# Patient Record
Sex: MALE | Race: BLACK OR AFRICAN AMERICAN | NOT HISPANIC OR LATINO | Employment: FULL TIME | URBAN - METROPOLITAN AREA
[De-identification: names, ages, dates, MRNs, and addresses within clinical notes are randomized per-mention and may not be internally consistent; named-entity substitution may affect disease eponyms.]

---

## 2021-09-21 ENCOUNTER — TELEPHONE (OUTPATIENT)
Dept: SPORTS MEDICINE | Facility: CLINIC | Age: 41
End: 2021-09-21

## 2021-09-23 ENCOUNTER — TELEPHONE (OUTPATIENT)
Dept: SPORTS MEDICINE | Facility: CLINIC | Age: 41
End: 2021-09-23

## 2021-10-11 ENCOUNTER — OFFICE VISIT (OUTPATIENT)
Dept: SPORTS MEDICINE | Facility: CLINIC | Age: 41
End: 2021-10-11
Payer: MEDICAID

## 2021-10-11 ENCOUNTER — HOSPITAL ENCOUNTER (OUTPATIENT)
Dept: RADIOLOGY | Facility: HOSPITAL | Age: 41
Discharge: HOME | End: 2021-10-11
Attending: ORTHOPAEDIC SURGERY
Payer: MEDICAID

## 2021-10-11 VITALS
TEMPERATURE: 98 F | HEART RATE: 73 BPM | SYSTOLIC BLOOD PRESSURE: 134 MMHG | BODY MASS INDEX: 32.21 KG/M2 | HEIGHT: 74 IN | DIASTOLIC BLOOD PRESSURE: 81 MMHG | WEIGHT: 251 LBS

## 2021-10-11 DIAGNOSIS — M17.31 POST-TRAUMATIC OSTEOARTHRITIS OF RIGHT KNEE: ICD-10-CM

## 2021-10-11 DIAGNOSIS — Z98.890 S/P ACL RECONSTRUCTION: ICD-10-CM

## 2021-10-11 DIAGNOSIS — M25.561 RIGHT KNEE PAIN, UNSPECIFIED CHRONICITY: ICD-10-CM

## 2021-10-11 DIAGNOSIS — M25.561 RIGHT KNEE PAIN, UNSPECIFIED CHRONICITY: Primary | ICD-10-CM

## 2021-10-11 PROCEDURE — 99214 PR OFFICE/OUTPT VISIT, EST, LEVL IV, 30-39 MIN: ICD-10-PCS | Mod: S$PBB,25,, | Performed by: ORTHOPAEDIC SURGERY

## 2021-10-11 PROCEDURE — 99214 OFFICE O/P EST MOD 30 MIN: CPT | Mod: S$PBB,25,, | Performed by: ORTHOPAEDIC SURGERY

## 2021-10-11 PROCEDURE — 73564 X-RAY EXAM KNEE 4 OR MORE: CPT | Mod: 26,50,, | Performed by: RADIOLOGY

## 2021-10-11 PROCEDURE — 20611 DRAIN/INJ JOINT/BURSA W/US: CPT | Mod: PBBFAC | Performed by: ORTHOPAEDIC SURGERY

## 2021-10-11 PROCEDURE — 99214 OFFICE O/P EST MOD 30 MIN: CPT | Mod: PBBFAC | Performed by: ORTHOPAEDIC SURGERY

## 2021-10-11 PROCEDURE — 97110 PR THERAPEUTIC EXERCISES: ICD-10-PCS | Mod: GP,,, | Performed by: ORTHOPAEDIC SURGERY

## 2021-10-11 PROCEDURE — 97110 THERAPEUTIC EXERCISES: CPT | Mod: GP,,, | Performed by: ORTHOPAEDIC SURGERY

## 2021-10-11 PROCEDURE — 99999 PR PBB SHADOW E&M-EST. PATIENT-LVL IV: CPT | Mod: PBBFAC,,, | Performed by: ORTHOPAEDIC SURGERY

## 2021-10-11 PROCEDURE — 73564 XR KNEE ORTHO BILAT WITH FLEXION: ICD-10-PCS | Mod: 26,50,, | Performed by: RADIOLOGY

## 2021-10-11 PROCEDURE — 99999 PR PBB SHADOW E&M-EST. PATIENT-LVL IV: ICD-10-PCS | Mod: PBBFAC,,, | Performed by: ORTHOPAEDIC SURGERY

## 2021-10-11 PROCEDURE — 20611 LARGE JOINT ASPIRATION/INJECTION: R KNEE: ICD-10-PCS | Mod: S$PBB,RT,, | Performed by: ORTHOPAEDIC SURGERY

## 2021-10-11 PROCEDURE — 73564 X-RAY EXAM KNEE 4 OR MORE: CPT | Mod: TC,50

## 2021-10-11 RX ORDER — MELOXICAM 15 MG/1
15 TABLET ORAL DAILY
Qty: 30 TABLET | Refills: 6 | Status: SHIPPED | OUTPATIENT
Start: 2021-10-11 | End: 2021-11-10

## 2021-10-11 RX ORDER — CELECOXIB 200 MG/1
200 CAPSULE ORAL 2 TIMES DAILY
Qty: 90 CAPSULE | Refills: 6 | Status: CANCELLED | OUTPATIENT
Start: 2021-10-11

## 2021-10-11 RX ADMIN — TRIAMCINOLONE ACETONIDE 80 MG: 40 INJECTION, SUSPENSION INTRA-ARTICULAR; INTRAMUSCULAR at 09:10

## 2021-10-14 RX ORDER — TRIAMCINOLONE ACETONIDE 40 MG/ML
80 INJECTION, SUSPENSION INTRA-ARTICULAR; INTRAMUSCULAR
Status: DISCONTINUED | OUTPATIENT
Start: 2021-10-11 | End: 2021-10-14 | Stop reason: HOSPADM

## 2021-10-20 ENCOUNTER — CLINICAL SUPPORT (OUTPATIENT)
Dept: REHABILITATION | Facility: HOSPITAL | Age: 41
End: 2021-10-20
Attending: ORTHOPAEDIC SURGERY
Payer: MEDICAID

## 2021-10-20 DIAGNOSIS — R26.2 DIFFICULTY WALKING: ICD-10-CM

## 2021-10-20 DIAGNOSIS — M25.561 CHRONIC PAIN OF RIGHT KNEE: Primary | ICD-10-CM

## 2021-10-20 DIAGNOSIS — Z98.890 S/P ACL RECONSTRUCTION: ICD-10-CM

## 2021-10-20 DIAGNOSIS — G89.29 CHRONIC PAIN OF RIGHT KNEE: Primary | ICD-10-CM

## 2021-10-20 DIAGNOSIS — M25.661 KNEE STIFFNESS, RIGHT: ICD-10-CM

## 2021-10-20 PROCEDURE — 97110 THERAPEUTIC EXERCISES: CPT | Performed by: PHYSICAL THERAPIST

## 2021-10-20 PROCEDURE — 97161 PT EVAL LOW COMPLEX 20 MIN: CPT | Performed by: PHYSICAL THERAPIST

## 2021-11-22 ENCOUNTER — CLINICAL SUPPORT (OUTPATIENT)
Dept: REHABILITATION | Facility: HOSPITAL | Age: 41
End: 2021-11-22
Payer: MEDICAID

## 2021-11-22 DIAGNOSIS — M25.661 KNEE STIFFNESS, RIGHT: ICD-10-CM

## 2021-11-22 DIAGNOSIS — R26.2 DIFFICULTY WALKING: ICD-10-CM

## 2021-11-22 PROCEDURE — 97110 THERAPEUTIC EXERCISES: CPT

## 2021-12-06 ENCOUNTER — PATIENT MESSAGE (OUTPATIENT)
Dept: SPORTS MEDICINE | Facility: CLINIC | Age: 41
End: 2021-12-06
Payer: MEDICAID

## 2021-12-29 ENCOUNTER — PATIENT MESSAGE (OUTPATIENT)
Dept: SPORTS MEDICINE | Facility: CLINIC | Age: 41
End: 2021-12-29
Payer: MEDICAID

## 2022-01-31 ENCOUNTER — OFFICE VISIT (OUTPATIENT)
Dept: SPORTS MEDICINE | Facility: CLINIC | Age: 42
End: 2022-01-31
Payer: MEDICAID

## 2022-01-31 VITALS
HEART RATE: 61 BPM | HEIGHT: 74 IN | BODY MASS INDEX: 31.44 KG/M2 | SYSTOLIC BLOOD PRESSURE: 129 MMHG | WEIGHT: 245 LBS | DIASTOLIC BLOOD PRESSURE: 84 MMHG

## 2022-01-31 DIAGNOSIS — M25.461 EFFUSION OF RIGHT KNEE: ICD-10-CM

## 2022-01-31 DIAGNOSIS — M12.561 TRAUMATIC ARTHRITIS OF KNEE, RIGHT: Primary | ICD-10-CM

## 2022-01-31 PROCEDURE — 99214 PR OFFICE/OUTPT VISIT, EST, LEVL IV, 30-39 MIN: ICD-10-PCS | Mod: S$PBB,,, | Performed by: PHYSICIAN ASSISTANT

## 2022-01-31 PROCEDURE — 3079F DIAST BP 80-89 MM HG: CPT | Mod: CPTII,,, | Performed by: PHYSICIAN ASSISTANT

## 2022-01-31 PROCEDURE — 1159F PR MEDICATION LIST DOCUMENTED IN MEDICAL RECORD: ICD-10-PCS | Mod: CPTII,,, | Performed by: PHYSICIAN ASSISTANT

## 2022-01-31 PROCEDURE — 3079F PR MOST RECENT DIASTOLIC BLOOD PRESSURE 80-89 MM HG: ICD-10-PCS | Mod: CPTII,,, | Performed by: PHYSICIAN ASSISTANT

## 2022-01-31 PROCEDURE — 3074F SYST BP LT 130 MM HG: CPT | Mod: CPTII,,, | Performed by: PHYSICIAN ASSISTANT

## 2022-01-31 PROCEDURE — 3008F PR BODY MASS INDEX (BMI) DOCUMENTED: ICD-10-PCS | Mod: CPTII,,, | Performed by: PHYSICIAN ASSISTANT

## 2022-01-31 PROCEDURE — 1160F PR REVIEW ALL MEDS BY PRESCRIBER/CLIN PHARMACIST DOCUMENTED: ICD-10-PCS | Mod: CPTII,,, | Performed by: PHYSICIAN ASSISTANT

## 2022-01-31 PROCEDURE — 99214 OFFICE O/P EST MOD 30 MIN: CPT | Mod: S$PBB,,, | Performed by: PHYSICIAN ASSISTANT

## 2022-01-31 PROCEDURE — 3074F PR MOST RECENT SYSTOLIC BLOOD PRESSURE < 130 MM HG: ICD-10-PCS | Mod: CPTII,,, | Performed by: PHYSICIAN ASSISTANT

## 2022-01-31 PROCEDURE — 99999 PR PBB SHADOW E&M-EST. PATIENT-LVL III: ICD-10-PCS | Mod: PBBFAC,,, | Performed by: PHYSICIAN ASSISTANT

## 2022-01-31 PROCEDURE — 1160F RVW MEDS BY RX/DR IN RCRD: CPT | Mod: CPTII,,, | Performed by: PHYSICIAN ASSISTANT

## 2022-01-31 PROCEDURE — 1159F MED LIST DOCD IN RCRD: CPT | Mod: CPTII,,, | Performed by: PHYSICIAN ASSISTANT

## 2022-01-31 PROCEDURE — 99999 PR PBB SHADOW E&M-EST. PATIENT-LVL III: CPT | Mod: PBBFAC,,, | Performed by: PHYSICIAN ASSISTANT

## 2022-01-31 PROCEDURE — 99213 OFFICE O/P EST LOW 20 MIN: CPT | Mod: PBBFAC | Performed by: PHYSICIAN ASSISTANT

## 2022-01-31 PROCEDURE — 3008F BODY MASS INDEX DOCD: CPT | Mod: CPTII,,, | Performed by: PHYSICIAN ASSISTANT

## 2022-01-31 NOTE — PROGRESS NOTES
Subjective:          Chief Complaint: Pau Brito is a 41 y.o. male who had concerns including Pain of the Right Knee.       Pau Brito is a 41 y.o. male  here for evaluation of his right knee. The pain started 10 years ago after pivot/twist mechanism of injury and is becoming progressively worse. He notes he had an ACL tear and surgery 10+ years ago and continues to have pain and disability. Pain is located over (points to) medial, lateral and anterior.He reports NSAIDS including ibuprofen with mild improvement previous treatments. Pain is affecting ADLs and limiting desired level of activity.   No relief with Mobic. Pain is 0/10 at rest and 10/10 at its worst. He attended formal PT twice and would like a new referral.     He received a CSI injection into his right knee on 10/11/21 with Dr. Duffy and states that pain improved by 50% for 1 month before arthritic pain gradually returned. He has been using his visco skin compression sleeve during physical activity that includes shooting basketball a few times a week. He has noticed recent right knee swelling but denies weakness or numbness.     Mechanical symptoms:locking and swelling  Subjective instability: (--)   Worse with activity and walking  Better with OTC NSAIDs  Nocturnal symptoms: (+)        Pain  Associated symptoms include joint swelling. Pertinent negatives include no abdominal pain, chest pain, chills, congestion, coughing, fever, nausea, numbness, rash, sore throat or vomiting.       Review of Systems   Constitutional: Negative for chills and fever.   HENT: Negative for congestion and sore throat.    Eyes: Negative for discharge and double vision.   Cardiovascular: Negative for chest pain, palpitations and syncope.   Respiratory: Negative for cough and shortness of breath.    Endocrine: Negative for cold intolerance and heat intolerance.   Skin: Negative for dry skin and rash.   Musculoskeletal: Positive for joint pain, joint swelling and  stiffness.   Gastrointestinal: Negative for abdominal pain, nausea and vomiting.   Neurological: Negative for focal weakness, numbness and paresthesias.                   Objective:        General: Pau is well-developed, well-nourished, appears stated age, in no acute distress, alert and oriented to time, place and person.     General    Vitals reviewed.  Constitutional: He is oriented to person, place, and time. He appears well-developed and well-nourished. No distress.   HENT:   Mouth/Throat: No oropharyngeal exudate.   Eyes: Right eye exhibits no discharge. Left eye exhibits no discharge.   Pulmonary/Chest: Effort normal and breath sounds normal. No respiratory distress.   Neurological: He is alert and oriented to person, place, and time. He has normal reflexes. No cranial nerve deficit. Coordination normal.   Psychiatric: He has a normal mood and affect. His behavior is normal. Judgment and thought content normal.     General Musculoskeletal Exam   Gait: abnormal and antalgic       Right Knee Exam     Inspection   Erythema: absent  Scars: present  Swelling: present  Effusion: present  Deformity: absent  Bruising: absent    Tenderness   The patient is tender to palpation of the medial joint line, lateral joint line and patella.    Crepitus   The patient has crepitus of the patella (moderate).    Range of Motion   Extension:  10 abnormal   Flexion:  130 abnormal     Tests   Meniscus   Mara:  Medial - negative Lateral - negative  Ligament Examination Lachman: normal (-1 to 2mm) PCL-Posterior Drawer: normal (0 to 2mm)     MCL - Valgus: normal (0 to 2mm)  LCL - Varus: normalPivot Shift: normal (Equal)Reverse Pivot Shift: normal (Equal)  Posterior Sag Test: negative  Posterolateral Corner: stable  Patella   Patellar apprehension: negative  Passive Patellar Tilt: neutral  Patellar Tracking: normal  Patellar Glide (quadrants): Lateral - 1   Medial - 2  Q-Angle at 90 degrees: normal  Patellar Grind:  negative  J-Sign: none    Other   Meniscal Cyst: absent  Popliteal (Baker's) Cyst: absent  Sensation: normal    Left Knee Exam     Inspection   Erythema: absent  Scars: absent  Swelling: absent  Effusion: absent  Deformity: absent  Bruising: absent    Tenderness   The patient is experiencing no tenderness.     Range of Motion   Extension: 0   Flexion:  140 abnormal     Tests   Meniscus   Mara:  Medial - negative Lateral - negative  Stability Lachman: normal (-1 to 2mm) PCL-Posterior Drawer: normal (0 to 2mm)  MCL - Valgus: normal (0 to 2mm)  LCL - Varus: normal (0 to 2mm)Pivot Shift: normal (Equal)Reverse Pivot Shift: normal (Equal)  Posterior Sag Test: negative  Posterolateral Corner: stable  Patella   Patellar apprehension: negative  Passive Patellar Tilt: neutral  Patellar Tracking: normal  Patellar Glide (Quadrants): Lateral - 1 Medial - 2  Q-Angle at 90 degrees: normal  Patellar Grind: negative  J-Sign: J sign absent    Other   Meniscal Cyst: absent  Popliteal (Baker's) Cyst: absent  Sensation: normal    Right Hip Exam     Tests   Jillian: negative  Left Hip Exam     Tests   Jillian: negative          Muscle Strength   Right Lower Extremity   Hip Abduction: 5/5   Quadriceps:  4/5   Hamstrin/5   Left Lower Extremity   Hip Abduction: 5/5   Quadriceps:  5/5   Hamstrin/5     Reflexes     Left Side  Achilles:  2+  Quadriceps:  2+    Right Side   Achilles:  2+  Quadriceps:  2+    Vascular Exam     Right Pulses  Dorsalis Pedis:      2+  Posterior Tibial:      2+        Left Pulses  Dorsalis Pedis:      2+  Posterior Tibial:      2+        Radiographic Findings:  X-ray Knee Ortho Bilateral with Flexion  Narrative: EXAMINATION:  XR KNEE ORTHO BILAT WITH FLEXION    CLINICAL HISTORY:  Pain in right knee    TECHNIQUE:  AP standing of both knees, PA flexion standing views of both knees, and Merchant views of both knees were performed.  Lateral views of both knees were also  performed.    COMPARISON:  None    FINDINGS:  Right knee joint is severely narrowed medially and laterally with sclerosis and complete loss of joint space.  Patellofemoral degenerative changes seen on the right there is probably some fluid in the suprapatellar bursa and there may be some loose bodies in the joint space.    Left knee joint is preserved.  No joint effusion is seen. There is possible separation of the upper pole of the left patella from an old injury or bony spurring arises from this area.  Impression: See above    Electronically signed by: Dhiraj Harrell MD  Date:    10/11/2021  Time:    10:17        These findings were discussed and reviewed with the patient.        Assessment:       Encounter Diagnoses   Name Primary?    Traumatic arthritis of knee, right Yes    Effusion of right knee           Plan:         1. IKDC, SF-12 and KOOS was not filled out today in clinic.     RTC in 2 weeks with  Selam Byrnes for right knee Zilretta injection;  will not fill out IKDC, SF-12 and KOOS on return.    2. No relief with Mobic. Celebrex is not approved by insurance. Continue OTC NSAIDS prn pain    3. Physical Therapy: New referral placed for [facility] for patellofemoral and quad strengthening program     4. Continue visco skin as needed    5. Order placed for Zilretta injection. If insurance does not approve, will place an order for Durolane    6.  I made the decision to obtain old records of the patient including previous notes and imaging. I independently reviewed and interpreted the radiographs today as well as prior imaging.    7. Visit Summary: At this time, Mr. Brito has a long standing history of knee pain on the RIGHT side. He is unsure who reconstructed his ACL 10+ years ago at [facility]. CSI provided relief for 4 weeks. Patient is to start PT now and we will see him back in 2 weeks for Zilretta injection. Given a brochure on Depuy total knee by Dr. Duffy. Patient is not interested in TKA at  this time. He will move forward with visco supplementation if denial of Zilretta.                      [facility] patient questionnaires have been collected today.

## 2022-02-18 ENCOUNTER — OFFICE VISIT (OUTPATIENT)
Dept: SPORTS MEDICINE | Facility: CLINIC | Age: 42
End: 2022-02-18
Payer: MEDICAID

## 2022-02-18 VITALS
HEART RATE: 64 BPM | SYSTOLIC BLOOD PRESSURE: 135 MMHG | DIASTOLIC BLOOD PRESSURE: 80 MMHG | HEIGHT: 74 IN | WEIGHT: 238 LBS | BODY MASS INDEX: 30.54 KG/M2

## 2022-02-18 DIAGNOSIS — M17.11 PRIMARY OSTEOARTHRITIS OF RIGHT KNEE: Primary | ICD-10-CM

## 2022-02-18 PROCEDURE — 99999 PR PBB SHADOW E&M-EST. PATIENT-LVL III: CPT | Mod: PBBFAC,,, | Performed by: PHYSICIAN ASSISTANT

## 2022-02-18 PROCEDURE — 3079F PR MOST RECENT DIASTOLIC BLOOD PRESSURE 80-89 MM HG: ICD-10-PCS | Mod: CPTII,,, | Performed by: PHYSICIAN ASSISTANT

## 2022-02-18 PROCEDURE — 99999 PR PBB SHADOW E&M-EST. PATIENT-LVL III: ICD-10-PCS | Mod: PBBFAC,,, | Performed by: PHYSICIAN ASSISTANT

## 2022-02-18 PROCEDURE — 3008F PR BODY MASS INDEX (BMI) DOCUMENTED: ICD-10-PCS | Mod: CPTII,,, | Performed by: PHYSICIAN ASSISTANT

## 2022-02-18 PROCEDURE — 3075F PR MOST RECENT SYSTOLIC BLOOD PRESS GE 130-139MM HG: ICD-10-PCS | Mod: CPTII,,, | Performed by: PHYSICIAN ASSISTANT

## 2022-02-18 PROCEDURE — 99499 NO LOS: ICD-10-PCS | Mod: S$PBB,,, | Performed by: PHYSICIAN ASSISTANT

## 2022-02-18 PROCEDURE — 1160F RVW MEDS BY RX/DR IN RCRD: CPT | Mod: CPTII,,, | Performed by: PHYSICIAN ASSISTANT

## 2022-02-18 PROCEDURE — 20610 DRAIN/INJ JOINT/BURSA W/O US: CPT | Mod: S$PBB,RT,, | Performed by: PHYSICIAN ASSISTANT

## 2022-02-18 PROCEDURE — 3008F BODY MASS INDEX DOCD: CPT | Mod: CPTII,,, | Performed by: PHYSICIAN ASSISTANT

## 2022-02-18 PROCEDURE — 1159F MED LIST DOCD IN RCRD: CPT | Mod: CPTII,,, | Performed by: PHYSICIAN ASSISTANT

## 2022-02-18 PROCEDURE — 20610 PR DRAIN/INJECT LARGE JOINT/BURSA: ICD-10-PCS | Mod: S$PBB,RT,, | Performed by: PHYSICIAN ASSISTANT

## 2022-02-18 PROCEDURE — 1159F PR MEDICATION LIST DOCUMENTED IN MEDICAL RECORD: ICD-10-PCS | Mod: CPTII,,, | Performed by: PHYSICIAN ASSISTANT

## 2022-02-18 PROCEDURE — 99213 OFFICE O/P EST LOW 20 MIN: CPT | Mod: PBBFAC,25 | Performed by: PHYSICIAN ASSISTANT

## 2022-02-18 PROCEDURE — 3075F SYST BP GE 130 - 139MM HG: CPT | Mod: CPTII,,, | Performed by: PHYSICIAN ASSISTANT

## 2022-02-18 PROCEDURE — 3079F DIAST BP 80-89 MM HG: CPT | Mod: CPTII,,, | Performed by: PHYSICIAN ASSISTANT

## 2022-02-18 PROCEDURE — 99499 UNLISTED E&M SERVICE: CPT | Mod: S$PBB,,, | Performed by: PHYSICIAN ASSISTANT

## 2022-02-18 PROCEDURE — 20610 DRAIN/INJ JOINT/BURSA W/O US: CPT | Mod: PBBFAC | Performed by: PHYSICIAN ASSISTANT

## 2022-02-18 PROCEDURE — 1160F PR REVIEW ALL MEDS BY PRESCRIBER/CLIN PHARMACIST DOCUMENTED: ICD-10-PCS | Mod: CPTII,,, | Performed by: PHYSICIAN ASSISTANT

## 2022-02-18 RX ADMIN — Medication 32 MG: at 09:02

## 2022-02-18 NOTE — PROGRESS NOTES
Patient is here for follow up of Right knee arthritis. Pt is requesting right knee Zilretta injection.  PMFH reviewed per encounter record. Has failed other conservative modalities including NSAIDS, activity modification, weight loss.    The prior shot was tolerated well.    PHYSICAL EXAMINATION:     General: The patient is alert and oriented x 3. Mood is pleasant.   Observation of ears, eyes and nose reveals no gross abnormalities. No   labored breathing observed.     No signs of infection or adverse reaction to knee.    PROCEDURE NOTE:  Aspiration/Injection Procedure right knee    A time out was performed, including verification of patient ID, procedure, site and side, availability of information and equipment, review of safety issues, and agreement with consent, the procedure site was marked.    Aspiration:  After time out was performed, the patient was prepped aseptically with povidone-iodine swabsticks. 55cc's of normal joint fluid was aspirated from the Superolateral  aspect of the right Knee Joint using a 22g x 1.5 needle in sterile fashion without complication.     Injection:  Following aspiration, a diagnostic and therapeutic injection of 5cc Zilretta injection was given under sterile technique in the supine position.     Pau Brito had no adverse reactions to the medication. Pain decreased. He was instructed to apply ice to the joint for 20 minutes and avoid strenuous activities for 24-36 hours following the injection. He was warned of possible blood sugar and/or blood pressure changes during that time. Following that time, he can resume regular activities.    RTC in 3 months with Selam Byrnes PA-C if continued pain. Recommended visco supplementation if no benefits from Zilretta. Patient would like to hold off on TKA at this time.     [id number] Venecia Elliott, performed a custom orthotic / brace adjustment, fitting and training with the patient. The patient demonstrated understanding and proper care. This  was performed for 15 minutes. 3D knee sleeve    Ice/elevate/compression    All questions were answered, pt will contact us for questions or concerns in the interim.

## 2022-05-09 ENCOUNTER — TELEPHONE (OUTPATIENT)
Dept: SPORTS MEDICINE | Facility: CLINIC | Age: 42
End: 2022-05-09
Payer: MEDICAID

## 2022-05-18 ENCOUNTER — OFFICE VISIT (OUTPATIENT)
Dept: SPORTS MEDICINE | Facility: CLINIC | Age: 42
End: 2022-05-18
Payer: MEDICAID

## 2022-05-18 ENCOUNTER — HOSPITAL ENCOUNTER (OUTPATIENT)
Dept: RADIOLOGY | Facility: HOSPITAL | Age: 42
Discharge: HOME | End: 2022-05-18
Attending: PHYSICIAN ASSISTANT
Payer: MEDICAID

## 2022-05-18 VITALS
SYSTOLIC BLOOD PRESSURE: 129 MMHG | HEIGHT: 74 IN | WEIGHT: 245 LBS | BODY MASS INDEX: 31.44 KG/M2 | DIASTOLIC BLOOD PRESSURE: 81 MMHG | HEART RATE: 64 BPM

## 2022-05-18 DIAGNOSIS — M25.461 EFFUSION OF RIGHT KNEE: ICD-10-CM

## 2022-05-18 DIAGNOSIS — M25.562 PAIN IN BOTH KNEES, UNSPECIFIED CHRONICITY: ICD-10-CM

## 2022-05-18 DIAGNOSIS — M17.11 PRIMARY OSTEOARTHRITIS OF RIGHT KNEE: Primary | ICD-10-CM

## 2022-05-18 DIAGNOSIS — M25.561 PAIN IN BOTH KNEES, UNSPECIFIED CHRONICITY: ICD-10-CM

## 2022-05-18 PROCEDURE — 99213 OFFICE O/P EST LOW 20 MIN: CPT | Mod: PBBFAC,25 | Performed by: PHYSICIAN ASSISTANT

## 2022-05-18 PROCEDURE — 73564 X-RAY EXAM KNEE 4 OR MORE: CPT | Mod: 26,50,, | Performed by: RADIOLOGY

## 2022-05-18 PROCEDURE — 73564 X-RAY EXAM KNEE 4 OR MORE: CPT | Mod: TC,50

## 2022-05-18 PROCEDURE — 3074F SYST BP LT 130 MM HG: CPT | Mod: CPTII,,, | Performed by: PHYSICIAN ASSISTANT

## 2022-05-18 PROCEDURE — 20610 DRAIN/INJ JOINT/BURSA W/O US: CPT | Mod: S$PBB,RT,, | Performed by: PHYSICIAN ASSISTANT

## 2022-05-18 PROCEDURE — 1159F MED LIST DOCD IN RCRD: CPT | Mod: CPTII,,, | Performed by: PHYSICIAN ASSISTANT

## 2022-05-18 PROCEDURE — 99214 PR OFFICE/OUTPT VISIT, EST, LEVL IV, 30-39 MIN: ICD-10-PCS | Mod: 25,S$PBB,, | Performed by: PHYSICIAN ASSISTANT

## 2022-05-18 PROCEDURE — 99214 OFFICE O/P EST MOD 30 MIN: CPT | Mod: 25,S$PBB,, | Performed by: PHYSICIAN ASSISTANT

## 2022-05-18 PROCEDURE — 3079F PR MOST RECENT DIASTOLIC BLOOD PRESSURE 80-89 MM HG: ICD-10-PCS | Mod: CPTII,,, | Performed by: PHYSICIAN ASSISTANT

## 2022-05-18 PROCEDURE — 3079F DIAST BP 80-89 MM HG: CPT | Mod: CPTII,,, | Performed by: PHYSICIAN ASSISTANT

## 2022-05-18 PROCEDURE — 3008F PR BODY MASS INDEX (BMI) DOCUMENTED: ICD-10-PCS | Mod: CPTII,,, | Performed by: PHYSICIAN ASSISTANT

## 2022-05-18 PROCEDURE — 99999 PR PBB SHADOW E&M-EST. PATIENT-LVL III: ICD-10-PCS | Mod: PBBFAC,,, | Performed by: PHYSICIAN ASSISTANT

## 2022-05-18 PROCEDURE — 73564 XR KNEE ORTHO BILAT WITH FLEXION: ICD-10-PCS | Mod: 26,50,, | Performed by: RADIOLOGY

## 2022-05-18 PROCEDURE — 99999 PR PBB SHADOW E&M-EST. PATIENT-LVL III: CPT | Mod: PBBFAC,,, | Performed by: PHYSICIAN ASSISTANT

## 2022-05-18 PROCEDURE — 1160F PR REVIEW ALL MEDS BY PRESCRIBER/CLIN PHARMACIST DOCUMENTED: ICD-10-PCS | Mod: CPTII,,, | Performed by: PHYSICIAN ASSISTANT

## 2022-05-18 PROCEDURE — 3074F PR MOST RECENT SYSTOLIC BLOOD PRESSURE < 130 MM HG: ICD-10-PCS | Mod: CPTII,,, | Performed by: PHYSICIAN ASSISTANT

## 2022-05-18 PROCEDURE — 20610 DRAIN/INJ JOINT/BURSA W/O US: CPT | Mod: PBBFAC | Performed by: PHYSICIAN ASSISTANT

## 2022-05-18 PROCEDURE — 1159F PR MEDICATION LIST DOCUMENTED IN MEDICAL RECORD: ICD-10-PCS | Mod: CPTII,,, | Performed by: PHYSICIAN ASSISTANT

## 2022-05-18 PROCEDURE — 20610 PR DRAIN/INJECT LARGE JOINT/BURSA: ICD-10-PCS | Mod: S$PBB,RT,, | Performed by: PHYSICIAN ASSISTANT

## 2022-05-18 PROCEDURE — 1160F RVW MEDS BY RX/DR IN RCRD: CPT | Mod: CPTII,,, | Performed by: PHYSICIAN ASSISTANT

## 2022-05-18 PROCEDURE — 3008F BODY MASS INDEX DOCD: CPT | Mod: CPTII,,, | Performed by: PHYSICIAN ASSISTANT

## 2022-05-18 RX ORDER — BUPIVACAINE HYDROCHLORIDE 2.5 MG/ML
3 INJECTION, SOLUTION INFILTRATION; PERINEURAL
Status: COMPLETED | OUTPATIENT
Start: 2022-05-18 | End: 2022-05-18

## 2022-05-18 RX ORDER — TRIAMCINOLONE ACETONIDE 40 MG/ML
40 INJECTION, SUSPENSION INTRA-ARTICULAR; INTRAMUSCULAR
Status: COMPLETED | OUTPATIENT
Start: 2022-05-18 | End: 2022-05-18

## 2022-05-18 RX ADMIN — TRIAMCINOLONE ACETONIDE 40 MG: 40 INJECTION, SUSPENSION INTRA-ARTICULAR; INTRAMUSCULAR at 01:05

## 2022-05-18 RX ADMIN — BUPIVACAINE HYDROCHLORIDE 7.5 MG: 2.5 INJECTION, SOLUTION INFILTRATION; PERINEURAL at 12:05

## 2022-05-18 NOTE — PROGRESS NOTES
Subjective:          Chief Complaint: Pau Brito is a 41 y.o. male who had concerns including Pain of the Right Knee.       Pau Brito is a 41 y.o. male  here for evaluation of his right knee.  He is requesting a right knee aspiration and CSI today.  He previously received a right knee Zilretta injection on 02/18/21 with 12 weeks relief of pain.  Prior to this, he received a CSI injection into his right knee on 10/11/21 with Dr. Duffy and states that pain improved by 50% for 1 month before arthritic pain gradually returned.    The pain started 10 years ago after pivot/twist mechanism of injury and is becoming progressively worse. He notes he had an ACL tear and surgery 10+ years ago and continues to have pain and disability. Pain is located over (points to) medial, lateral and anterior.He has taken NSAIDS including ibuprofen with mild improvement previous treatments. Pain is affecting ADLs and limiting desired level of activity.   No relief with Mobic. Pain is 0/10 at rest and 10/10 at its worst.      He has been using his visco skin compression sleeve during physical activity that includes shooting basketball a few times a week. He has noticed recent right knee swelling but denies weakness or numbness.     Mechanical symptoms:locking and swelling  Subjective instability: (--)   Worse with activity and walking  Better with OTC NSAIDs  Nocturnal symptoms: (+)        Pain  Associated symptoms include joint swelling. Pertinent negatives include no abdominal pain, chest pain, chills, congestion, coughing, fever, nausea, numbness, rash, sore throat or vomiting.       Review of Systems   Constitutional: Negative for chills and fever.   HENT: Negative for congestion and sore throat.    Eyes: Negative for discharge and double vision.   Cardiovascular: Negative for chest pain, palpitations and syncope.   Respiratory: Negative for cough and shortness of breath.    Endocrine: Negative for cold intolerance and heat  intolerance.   Skin: Negative for dry skin and rash.   Musculoskeletal: Positive for joint pain, joint swelling and stiffness.   Gastrointestinal: Negative for abdominal pain, nausea and vomiting.   Neurological: Negative for focal weakness, numbness and paresthesias.                   Objective:        General: Pau is well-developed, well-nourished, appears stated age, in no acute distress, alert and oriented to time, place and person.     General    Vitals reviewed.  Constitutional: He is oriented to person, place, and time. He appears well-developed and well-nourished. No distress.   HENT:   Mouth/Throat: No oropharyngeal exudate.   Eyes: Right eye exhibits no discharge. Left eye exhibits no discharge.   Pulmonary/Chest: Effort normal and breath sounds normal. No respiratory distress.   Neurological: He is alert and oriented to person, place, and time. He has normal reflexes. No cranial nerve deficit. Coordination normal.   Psychiatric: He has a normal mood and affect. His behavior is normal. Judgment and thought content normal.     General Musculoskeletal Exam   Gait: abnormal and antalgic       Right Knee Exam     Inspection   Erythema: absent  Scars: present  Swelling: present  Effusion: present  Deformity: absent  Bruising: absent    Tenderness   The patient is tender to palpation of the medial joint line, lateral joint line and patella.    Crepitus   The patient has crepitus of the patella (moderate).    Range of Motion   Extension:  10 abnormal   Flexion:  120 abnormal     Tests   Meniscus   Mara:  Medial - negative Lateral - negative  Ligament Examination Lachman: normal (-1 to 2mm) PCL-Posterior Drawer: normal (0 to 2mm)     MCL - Valgus: normal (0 to 2mm)  LCL - Varus: normalPivot Shift: normal (Equal)Reverse Pivot Shift: normal (Equal)  Posterior Sag Test: negative  Posterolateral Corner: stable  Patella   Patellar apprehension: negative  Passive Patellar Tilt: neutral  Patellar Tracking:  normal  Patellar Glide (quadrants): Lateral - 1   Medial - 2  Q-Angle at 90 degrees: normal  Patellar Grind: negative  J-Sign: none    Other   Meniscal Cyst: absent  Popliteal (Baker's) Cyst: absent  Sensation: normal    Left Knee Exam     Inspection   Erythema: absent  Scars: absent  Swelling: absent  Effusion: absent  Deformity: absent  Bruising: absent    Tenderness   The patient is experiencing no tenderness.     Range of Motion   Extension: 0   Flexion:  140 abnormal     Tests   Meniscus   Mara:  Medial - negative Lateral - negative  Stability Lachman: normal (-1 to 2mm) PCL-Posterior Drawer: normal (0 to 2mm)  MCL - Valgus: normal (0 to 2mm)  LCL - Varus: normal (0 to 2mm)Pivot Shift: normal (Equal)Reverse Pivot Shift: normal (Equal)  Posterior Sag Test: negative  Posterolateral Corner: stable  Patella   Patellar apprehension: negative  Passive Patellar Tilt: neutral  Patellar Tracking: normal  Patellar Glide (Quadrants): Lateral - 1 Medial - 2  Q-Angle at 90 degrees: normal  Patellar Grind: negative  J-Sign: J sign absent    Other   Meniscal Cyst: absent  Popliteal (Baker's) Cyst: absent  Sensation: normal    Right Hip Exam     Tests   Jillian: negative  Left Hip Exam     Tests   Jillian: negative          Muscle Strength   Right Lower Extremity   Hip Abduction: 5/5   Quadriceps:  4/5   Hamstrin/5   Left Lower Extremity   Hip Abduction: 5/5   Quadriceps:  5/5   Hamstrin/5     Reflexes     Left Side  Achilles:  2+  Quadriceps:  2+    Right Side   Achilles:  2+  Quadriceps:  2+    Vascular Exam     Right Pulses  Dorsalis Pedis:      2+  Posterior Tibial:      2+        Left Pulses  Dorsalis Pedis:      2+  Posterior Tibial:      2+        Radiographic Findings:  X-ray Knee Ortho Bilateral with Flexion  Narrative: EXAMINATION:  XR KNEE ORTHO BILAT WITH FLEXION    CLINICAL HISTORY:  Pain in right knee    TECHNIQUE:  AP standing of both knees, PA flexion standing views of both knees, and Merchant views of  both knees were performed.  Lateral views of both knees were also performed.    COMPARISON:  Plain film from 10/11/2021    FINDINGS:  Right knee: Severe tricompartment degenerative changes of the right knee with stable chronic changes of the proximal tibia.  Prominent osteophyte formation.  Small joint effusion.  No evidence of acute fracture or dislocation.    Left knee: Tricompartment degenerative changes with mild osteophyte formation.  Small joint effusion.  Osseous fragment superior to the patella likely representing remote injury.  This is not significantly changed when compared to prior exam.  No evidence of acute fracture.  Impression: As above.    Electronically signed by: Aba Holliday MD  Date:    05/18/2022  Time:    08:22        These findings were discussed and reviewed with the patient.        Assessment:       Encounter Diagnoses   Name Primary?    Primary osteoarthritis of right knee Yes    Effusion of right knee           Plan:         1. IKDC, SF-12 and KOOS was not filled out today in clinic.     RTC in 3 months with  Selam Byrnes for right knee Zilretta injection;  will not fill out IKDC, SF-12 and KOOS on return.    2.  Continue OTC NSAIDS prn pain    3. Continue [facility] patellofemoral and quad strengthening program     4. Continue visco skin as needed    5. Order placed for Zilretta injection. Explained that he could receive a CSI today or a Zilretta in 1 month. He elected to receive short acting CSI today.    6.  I made the decision to obtain old records of the patient including previous notes and imaging. New imaging was ordered today of the extremity or extremities evaluated. I independently reviewed and interpreted the radiographs and/or MRIs today as well as prior imaging. Reviewed radiographs with patient in detail.    7. Aspiration/Injection Procedure right knee    A time out was performed, including verification of patient ID, procedure, site and side, availability of information  and equipment, review of safety issues, and agreement with consent, the procedure site was marked.    Aspiration:  After time out was performed, the patient was prepped aseptically with povidone-iodine swabsticks. 54cc's of normal joint fluid was aspirated from the Superolateral  aspect of the right Knee Joint using a 22g x 1.5 needle in sterile fashion without complication.     Injection:  Following aspiration, a diagnostic and therapeutic injection of 1:3cc Kenalog/Marcaine was given under sterile technique in the supine position.     Pau Brito had no adverse reactions to the medication. Pain decreased. He was instructed to apply ice to the joint for 20 minutes and avoid strenuous activities for 24-36 hours following the injection. He was warned of possible blood sugar and/or blood pressure changes during that time. Following that time, he can resume regular activities.    7. Visit Summary: At this time, Mr. Brito has a long standing history of knee pain on the RIGHT side. He is unsure who reconstructed his ACL 10+ years ago at [facility]. CSI provided today. Given a brochure on Depuy total knee by Dr. Duffy. Patient is not interested in TKA at this time. He will move forward with visco supplementation if denial of Zilretta.                      [facility] patient questionnaires have been collected today.

## 2022-08-17 ENCOUNTER — OFFICE VISIT (OUTPATIENT)
Dept: SPORTS MEDICINE | Facility: CLINIC | Age: 42
End: 2022-08-17
Payer: MEDICAID

## 2022-08-17 VITALS
BODY MASS INDEX: 31.57 KG/M2 | HEART RATE: 66 BPM | SYSTOLIC BLOOD PRESSURE: 129 MMHG | HEIGHT: 74 IN | WEIGHT: 246 LBS | DIASTOLIC BLOOD PRESSURE: 90 MMHG

## 2022-08-17 DIAGNOSIS — M17.11 PRIMARY OSTEOARTHRITIS OF RIGHT KNEE: Primary | ICD-10-CM

## 2022-08-17 PROCEDURE — 1160F PR REVIEW ALL MEDS BY PRESCRIBER/CLIN PHARMACIST DOCUMENTED: ICD-10-PCS | Mod: CPTII,,, | Performed by: PHYSICIAN ASSISTANT

## 2022-08-17 PROCEDURE — 99499 UNLISTED E&M SERVICE: CPT | Mod: S$PBB,,, | Performed by: PHYSICIAN ASSISTANT

## 2022-08-17 PROCEDURE — 1159F PR MEDICATION LIST DOCUMENTED IN MEDICAL RECORD: ICD-10-PCS | Mod: CPTII,,, | Performed by: PHYSICIAN ASSISTANT

## 2022-08-17 PROCEDURE — 99999 PR PBB SHADOW E&M-EST. PATIENT-LVL III: ICD-10-PCS | Mod: PBBFAC,,, | Performed by: PHYSICIAN ASSISTANT

## 2022-08-17 PROCEDURE — 3074F PR MOST RECENT SYSTOLIC BLOOD PRESSURE < 130 MM HG: ICD-10-PCS | Mod: CPTII,,, | Performed by: PHYSICIAN ASSISTANT

## 2022-08-17 PROCEDURE — 20610 PR DRAIN/INJECT LARGE JOINT/BURSA: ICD-10-PCS | Mod: S$PBB,RT,, | Performed by: PHYSICIAN ASSISTANT

## 2022-08-17 PROCEDURE — 99999 PR PBB SHADOW E&M-EST. PATIENT-LVL III: CPT | Mod: PBBFAC,,, | Performed by: PHYSICIAN ASSISTANT

## 2022-08-17 PROCEDURE — 99213 OFFICE O/P EST LOW 20 MIN: CPT | Mod: PBBFAC,25 | Performed by: PHYSICIAN ASSISTANT

## 2022-08-17 PROCEDURE — 20610 DRAIN/INJ JOINT/BURSA W/O US: CPT | Mod: PBBFAC | Performed by: PHYSICIAN ASSISTANT

## 2022-08-17 PROCEDURE — 3080F DIAST BP >= 90 MM HG: CPT | Mod: CPTII,,, | Performed by: PHYSICIAN ASSISTANT

## 2022-08-17 PROCEDURE — 3080F PR MOST RECENT DIASTOLIC BLOOD PRESSURE >= 90 MM HG: ICD-10-PCS | Mod: CPTII,,, | Performed by: PHYSICIAN ASSISTANT

## 2022-08-17 PROCEDURE — 3008F BODY MASS INDEX DOCD: CPT | Mod: CPTII,,, | Performed by: PHYSICIAN ASSISTANT

## 2022-08-17 PROCEDURE — 3074F SYST BP LT 130 MM HG: CPT | Mod: CPTII,,, | Performed by: PHYSICIAN ASSISTANT

## 2022-08-17 PROCEDURE — 1159F MED LIST DOCD IN RCRD: CPT | Mod: CPTII,,, | Performed by: PHYSICIAN ASSISTANT

## 2022-08-17 PROCEDURE — 99499 NO LOS: ICD-10-PCS | Mod: S$PBB,,, | Performed by: PHYSICIAN ASSISTANT

## 2022-08-17 PROCEDURE — 3008F PR BODY MASS INDEX (BMI) DOCUMENTED: ICD-10-PCS | Mod: CPTII,,, | Performed by: PHYSICIAN ASSISTANT

## 2022-08-17 PROCEDURE — 1160F RVW MEDS BY RX/DR IN RCRD: CPT | Mod: CPTII,,, | Performed by: PHYSICIAN ASSISTANT

## 2022-08-17 PROCEDURE — 20610 DRAIN/INJ JOINT/BURSA W/O US: CPT | Mod: S$PBB,RT,, | Performed by: PHYSICIAN ASSISTANT

## 2022-08-17 RX ADMIN — Medication 32 MG: at 09:08

## 2022-08-17 NOTE — PROGRESS NOTES
Patient is here for follow up of Right knee arthritis. Pt is requesting right knee Zilretta injection.  [facility] Has failed other conservative modalities including NSAIDS, activity modification, weight loss.    The prior shot was tolerated well.    PHYSICAL EXAMINATION:     General: The patient is alert and oriented x 3. Mood is pleasant.   Observation of ears, eyes and nose reveals no gross abnormalities. No   labored breathing observed.     No signs of infection or adverse reaction to knee.    PROCEDURE NOTE:  Aspiration/Injection Procedure right knee    A time out was performed, including verification of patient ID, procedure, site and side, availability of information and equipment, review of safety issues, and agreement with consent, the procedure site was marked.    Aspiration:  After time out was performed, the patient was prepped aseptically with povidone-iodine swabsticks. 49cc's of normal joint fluid was aspirated from the Superolateral  aspect of the right Knee Joint using a 22g x 1.5 needle in sterile fashion without complication.     Injection:  Following aspiration, a diagnostic and therapeutic injection of 5cc Zilretta injection was given under sterile technique in the supine position.     Pau Brito had no adverse reactions to the medication. Pain decreased. He was instructed to apply ice to the joint for 20 minutes and avoid strenuous activities for 24-36 hours following the injection. He was warned of possible blood sugar and/or blood pressure changes during that time. Following that time, he can resume regular activities.    RTC in 3 months with Selam Byrnes PA-C if continued pain.Patient would like to hold off on TKA at this time.     Continue 3D knee sleeve    Ice/elevate/compression    All questions were answered, pt will contact us for questions or concerns in the interim.

## 2022-11-16 ENCOUNTER — OFFICE VISIT (OUTPATIENT)
Dept: SPORTS MEDICINE | Facility: CLINIC | Age: 42
End: 2022-11-16
Payer: MEDICAID

## 2022-11-16 VITALS
WEIGHT: 256 LBS | HEIGHT: 74 IN | BODY MASS INDEX: 32.85 KG/M2 | DIASTOLIC BLOOD PRESSURE: 89 MMHG | HEART RATE: 57 BPM | SYSTOLIC BLOOD PRESSURE: 140 MMHG

## 2022-11-16 DIAGNOSIS — G89.29 CHRONIC PAIN OF RIGHT KNEE: ICD-10-CM

## 2022-11-16 DIAGNOSIS — M23.91 INTERNAL DERANGEMENT OF RIGHT KNEE: ICD-10-CM

## 2022-11-16 DIAGNOSIS — M17.11 PRIMARY OSTEOARTHRITIS OF RIGHT KNEE: Primary | ICD-10-CM

## 2022-11-16 DIAGNOSIS — M25.561 CHRONIC PAIN OF RIGHT KNEE: ICD-10-CM

## 2022-11-16 PROCEDURE — 99213 OFFICE O/P EST LOW 20 MIN: CPT | Mod: PBBFAC | Performed by: PHYSICIAN ASSISTANT

## 2022-11-16 PROCEDURE — 3077F PR MOST RECENT SYSTOLIC BLOOD PRESSURE >= 140 MM HG: ICD-10-PCS | Mod: CPTII,,, | Performed by: PHYSICIAN ASSISTANT

## 2022-11-16 PROCEDURE — 3008F BODY MASS INDEX DOCD: CPT | Mod: CPTII,,, | Performed by: PHYSICIAN ASSISTANT

## 2022-11-16 PROCEDURE — 20610 DRAIN/INJ JOINT/BURSA W/O US: CPT | Mod: S$PBB,RT,, | Performed by: PHYSICIAN ASSISTANT

## 2022-11-16 PROCEDURE — 99213 OFFICE O/P EST LOW 20 MIN: CPT | Mod: 25,S$PBB,, | Performed by: PHYSICIAN ASSISTANT

## 2022-11-16 PROCEDURE — 1160F RVW MEDS BY RX/DR IN RCRD: CPT | Mod: CPTII,,, | Performed by: PHYSICIAN ASSISTANT

## 2022-11-16 PROCEDURE — 99999 PR PBB SHADOW E&M-EST. PATIENT-LVL III: ICD-10-PCS | Mod: PBBFAC,,, | Performed by: PHYSICIAN ASSISTANT

## 2022-11-16 PROCEDURE — 1159F PR MEDICATION LIST DOCUMENTED IN MEDICAL RECORD: ICD-10-PCS | Mod: CPTII,,, | Performed by: PHYSICIAN ASSISTANT

## 2022-11-16 PROCEDURE — 20610 PR DRAIN/INJECT LARGE JOINT/BURSA: ICD-10-PCS | Mod: S$PBB,RT,, | Performed by: PHYSICIAN ASSISTANT

## 2022-11-16 PROCEDURE — 99999 PR PBB SHADOW E&M-EST. PATIENT-LVL III: CPT | Mod: PBBFAC,,, | Performed by: PHYSICIAN ASSISTANT

## 2022-11-16 PROCEDURE — 20610 DRAIN/INJ JOINT/BURSA W/O US: CPT | Mod: PBBFAC | Performed by: PHYSICIAN ASSISTANT

## 2022-11-16 PROCEDURE — 1160F PR REVIEW ALL MEDS BY PRESCRIBER/CLIN PHARMACIST DOCUMENTED: ICD-10-PCS | Mod: CPTII,,, | Performed by: PHYSICIAN ASSISTANT

## 2022-11-16 PROCEDURE — 3077F SYST BP >= 140 MM HG: CPT | Mod: CPTII,,, | Performed by: PHYSICIAN ASSISTANT

## 2022-11-16 PROCEDURE — 3079F DIAST BP 80-89 MM HG: CPT | Mod: CPTII,,, | Performed by: PHYSICIAN ASSISTANT

## 2022-11-16 PROCEDURE — 1159F MED LIST DOCD IN RCRD: CPT | Mod: CPTII,,, | Performed by: PHYSICIAN ASSISTANT

## 2022-11-16 PROCEDURE — 3079F PR MOST RECENT DIASTOLIC BLOOD PRESSURE 80-89 MM HG: ICD-10-PCS | Mod: CPTII,,, | Performed by: PHYSICIAN ASSISTANT

## 2022-11-16 PROCEDURE — 99213 PR OFFICE/OUTPT VISIT, EST, LEVL III, 20-29 MIN: ICD-10-PCS | Mod: 25,S$PBB,, | Performed by: PHYSICIAN ASSISTANT

## 2022-11-16 PROCEDURE — 3008F PR BODY MASS INDEX (BMI) DOCUMENTED: ICD-10-PCS | Mod: CPTII,,, | Performed by: PHYSICIAN ASSISTANT

## 2022-11-16 RX ORDER — BUPIVACAINE HYDROCHLORIDE 2.5 MG/ML
3 INJECTION, SOLUTION INFILTRATION; PERINEURAL
Status: COMPLETED | OUTPATIENT
Start: 2022-11-16 | End: 2022-11-16

## 2022-11-16 RX ORDER — TRIAMCINOLONE ACETONIDE 40 MG/ML
40 INJECTION, SUSPENSION INTRA-ARTICULAR; INTRAMUSCULAR
Status: COMPLETED | OUTPATIENT
Start: 2022-11-16 | End: 2022-11-16

## 2022-11-16 RX ADMIN — TRIAMCINOLONE ACETONIDE 40 MG: 40 INJECTION, SUSPENSION INTRA-ARTICULAR; INTRAMUSCULAR at 12:11

## 2022-11-16 RX ADMIN — BUPIVACAINE HYDROCHLORIDE 7.5 MG: 2.5 INJECTION, SOLUTION INFILTRATION; PERINEURAL at 12:11

## 2022-11-16 NOTE — PROGRESS NOTES
Subjective:          Chief Complaint: Pau Brito is a 42 y.o. male who had concerns including Pain of the Right Knee.       Pau Brito is a 41 y.o. male  here for follow up right knee.  He is requesting a right knee aspiration and CSI today.  He previously received a right knee Zilretta injection on 02/18/22 and 8/17/22 with 12 weeks relief of pain.  He has been also been receiving a short acting CSI every 6 months as well.    The pain started 10 years ago after pivot/twist mechanism of injury and is becoming progressively worse. He notes he had an ACL tear and surgery 10+ years ago and continues to have pain and disability. Pain is located over (points to) medial, lateral and anterior.He has taken NSAIDS including ibuprofen with mild improvement previous treatments. Pain is affecting ADLs and limiting desired level of activity.   No relief with Mobic. Pain is 0/10 at rest and 10/10 at its worst.      He has been using his visco skin compression sleeve during physical activity that includes shooting basketball a few times a week. He has noticed recent right knee swelling but denies weakness or numbness.     Mechanical symptoms:locking and swelling  Subjective instability: (--)   Worse with activity and walking  Better with OTC NSAIDs  Nocturnal symptoms: (+)        Pain  Associated symptoms include joint swelling. Pertinent negatives include no abdominal pain, chest pain, chills, congestion, coughing, fever, nausea, numbness, rash, sore throat or vomiting.     Review of Systems   Constitutional: Negative for chills and fever.   HENT:  Negative for congestion and sore throat.    Eyes:  Negative for discharge and double vision.   Cardiovascular:  Negative for chest pain, palpitations and syncope.   Respiratory:  Negative for cough and shortness of breath.    Endocrine: Negative for cold intolerance and heat intolerance.   Skin:  Negative for dry skin and rash.   Musculoskeletal:  Positive for joint pain,  joint swelling and stiffness.   Gastrointestinal:  Negative for abdominal pain, nausea and vomiting.   Neurological:  Negative for focal weakness, numbness and paresthesias.                 Objective:        General: Pau is well-developed, well-nourished, appears stated age, in no acute distress, alert and oriented to time, place and person.     General    Vitals reviewed.  Constitutional: He is oriented to person, place, and time. He appears well-developed and well-nourished. No distress.   HENT:   Mouth/Throat: No oropharyngeal exudate.   Eyes: Right eye exhibits no discharge. Left eye exhibits no discharge.   Pulmonary/Chest: Effort normal and breath sounds normal. No respiratory distress.   Neurological: He is alert and oriented to person, place, and time. He has normal reflexes. No cranial nerve deficit. Coordination normal.   Psychiatric: He has a normal mood and affect. His behavior is normal. Judgment and thought content normal.     General Musculoskeletal Exam   Gait: abnormal and antalgic       Right Knee Exam     Inspection   Erythema: absent  Scars: present  Swelling: present  Effusion: present  Deformity: absent  Bruising: absent    Tenderness   The patient is tender to palpation of the medial joint line, lateral joint line and patella.    Crepitus   The patient has crepitus of the patella (moderate).    Range of Motion   Extension:  10 abnormal   Flexion:  120 abnormal     Tests   Meniscus   Mara:  Medial - negative Lateral - negative  Ligament Examination   Lachman: normal (-1 to 2mm)   PCL-Posterior Drawer: normal (0 to 2mm)     MCL - Valgus: normal (0 to 2mm)  LCL - Varus: normal  Pivot Shift: normal (Equal)  Reverse Pivot Shift: normal (Equal)  Posterior Sag Test: negative  Posterolateral Corner: stable  Patella   Patellar apprehension: negative  Passive Patellar Tilt: neutral  Patellar Tracking: normal  Patellar Glide (quadrants): Lateral - 1   Medial - 2  Q-Angle at 90 degrees:  normal  Patellar Grind: negative  J-Sign: none    Other   Meniscal Cyst: absent  Popliteal (Baker's) Cyst: absent  Sensation: normal    Left Knee Exam     Inspection   Erythema: absent  Scars: absent  Swelling: absent  Effusion: absent  Deformity: absent  Bruising: absent    Tenderness   The patient is experiencing no tenderness.     Range of Motion   Extension:  0   Flexion:  140 abnormal     Tests   Meniscus   Mara:  Medial - negative Lateral - negative  Stability   Lachman: normal (-1 to 2mm)   PCL-Posterior Drawer: normal (0 to 2mm)  MCL - Valgus: normal (0 to 2mm)  LCL - Varus: normal (0 to 2mm)  Pivot Shift: normal (Equal)  Reverse Pivot Shift: normal (Equal)  Posterior Sag Test: negative  Posterolateral Corner: stable  Patella   Patellar apprehension: negative  Passive Patellar Tilt: neutral  Patellar Tracking: normal  Patellar Glide (Quadrants): Lateral - 1 Medial - 2  Q-Angle at 90 degrees: normal  Patellar Grind: negative  J-Sign: J sign absent    Other   Meniscal Cyst: absent  Popliteal (Baker's) Cyst: absent  Sensation: normal    Right Hip Exam     Tests   Jillian: negative  Left Hip Exam     Tests   Jillian: negative          Muscle Strength   Right Lower Extremity   Hip Abduction: 5/5   Quadriceps:  4/5   Hamstrin/5   Left Lower Extremity   Hip Abduction: 5/5   Quadriceps:  5/5   Hamstrin/5     Reflexes     Left Side  Achilles:  2+  Quadriceps:  2+    Right Side   Achilles:  2+  Quadriceps:  2+    Vascular Exam     Right Pulses  Dorsalis Pedis:      2+  Posterior Tibial:      2+        Left Pulses  Dorsalis Pedis:      2+  Posterior Tibial:      2+    Radiographic Findings:  X-ray Knee Ortho Bilateral with Flexion  Narrative: EXAMINATION:  XR KNEE ORTHO BILAT WITH FLEXION    CLINICAL HISTORY:  Pain in right knee    TECHNIQUE:  AP standing of both knees, PA flexion standing views of both knees, and Merchant views of both knees were performed.  Lateral views of both knees were also  performed.    COMPARISON:  Plain film from 10/11/2021    FINDINGS:  Right knee: Severe tricompartment degenerative changes of the right knee with stable chronic changes of the proximal tibia.  Prominent osteophyte formation.  Small joint effusion.  No evidence of acute fracture or dislocation.    Left knee: Tricompartment degenerative changes with mild osteophyte formation.  Small joint effusion.  Osseous fragment superior to the patella likely representing remote injury.  This is not significantly changed when compared to prior exam.  No evidence of acute fracture.  Impression: As above.    Electronically signed by: Aba Holliday MD  Date:    05/18/2022  Time:    08:22        Assessment:       Encounter Diagnoses   Name Primary?    Primary osteoarthritis of right knee Yes    Internal derangement of right knee     Chronic pain of right knee           Plan:         1. IKDC, SF-12 and KOOS was not filled out today in clinic.     RTC in 2 weeks with  Selam Byrnes for right knee Iovera. will not fill out IKDC, SF-12 and KOOS on return.  RTC in 3 months for right knee Zilretta injection.     2.  Continue OTC NSAIDS prn pain    3. Continue HEP patellofemoral and quad strengthening program     4. Continue visco skin as needed    5. Order placed for Zilretta injection  Order placed for Iovera right knee to be performed on 12/2/22    6.  I made the decision to obtain old records of the patient including previous notes and imaging. I independently reviewed and interpreted the radiographs and/or MRIs today as well as prior imaging. Reviewed radiographs with patient in detail.    7. Aspiration/Injection Procedure right knee    A time out was performed, including verification of patient ID, procedure, site and side, availability of information and equipment, review of safety issues, and agreement with consent, the procedure site was marked.    Aspiration:  After time out was performed, the patient was prepped aseptically  with povidone-iodine swabsticks. 47cc's of normal joint fluid was aspirated from the Superolateral  aspect of the right Knee Joint using a 22g x 1.5 needle in sterile fashion without complication.     Injection:  Following aspiration, a diagnostic and therapeutic injection of 1:3cc Kenalog/Marcaine was given under sterile technique in the supine position.     Pau Brito had no adverse reactions to the medication. Pain decreased. He was instructed to apply ice to the joint for 20 minutes and avoid strenuous activities for 24-36 hours following the injection. He was warned of possible blood sugar and/or blood pressure changes during that time. Following that time, he can resume regular activities.    7. Visit Summary: At this time, Mr. Brito has a long standing history of knee pain on the RIGHT side. He is unsure who reconstructed his ACL 10+ years ago at [facility]. CSI provided today. Given a brochure on Depuy total knee by Dr. Duffy. Patient is not interested in TKA at this time. Discussed Iovera with patient today in order to avoid TKA. He would like to try this.                      [facility] patient questionnaires have been collected today.

## 2022-11-30 ENCOUNTER — PATIENT MESSAGE (OUTPATIENT)
Dept: SPORTS MEDICINE | Facility: CLINIC | Age: 42
End: 2022-11-30
Payer: MEDICAID

## 2022-12-01 ENCOUNTER — PATIENT MESSAGE (OUTPATIENT)
Dept: SPORTS MEDICINE | Facility: CLINIC | Age: 42
End: 2022-12-01
Payer: MEDICAID

## 2022-12-02 ENCOUNTER — PROCEDURE VISIT (OUTPATIENT)
Dept: SPORTS MEDICINE | Facility: CLINIC | Age: 42
End: 2022-12-02
Payer: MEDICAID

## 2022-12-02 VITALS
WEIGHT: 251 LBS | HEIGHT: 74 IN | BODY MASS INDEX: 32.21 KG/M2 | DIASTOLIC BLOOD PRESSURE: 78 MMHG | SYSTOLIC BLOOD PRESSURE: 128 MMHG | HEART RATE: 72 BPM

## 2022-12-02 DIAGNOSIS — G89.29 CHRONIC PAIN OF RIGHT KNEE: ICD-10-CM

## 2022-12-02 DIAGNOSIS — M17.11 PRIMARY OSTEOARTHRITIS OF RIGHT KNEE: ICD-10-CM

## 2022-12-02 DIAGNOSIS — M25.561 CHRONIC PAIN OF RIGHT KNEE: ICD-10-CM

## 2022-12-02 PROCEDURE — 64640 PR DESTRUCT BY NEURO AGENT; OTHER PERIPH NERVE: ICD-10-PCS | Mod: S$PBB,RT,, | Performed by: PHYSICIAN ASSISTANT

## 2022-12-02 PROCEDURE — 64640 INJECTION TREATMENT OF NERVE: CPT | Mod: S$PBB,RT,, | Performed by: PHYSICIAN ASSISTANT

## 2022-12-02 PROCEDURE — 64640 INJECTION TREATMENT OF NERVE: CPT | Mod: PBBFAC | Performed by: PHYSICIAN ASSISTANT

## 2022-12-02 PROCEDURE — 99499 NO LOS: ICD-10-PCS | Mod: S$PBB,,, | Performed by: PHYSICIAN ASSISTANT

## 2022-12-02 PROCEDURE — 99499 UNLISTED E&M SERVICE: CPT | Mod: S$PBB,,, | Performed by: PHYSICIAN ASSISTANT

## 2022-12-02 NOTE — PATIENT INSTRUCTIONS
You had the Iovera procedure done to your knee today.  There may be bruising over the procedure area for the next few days.  Avoid soaking in standing water (ie bath or hot tub) for the next 24hrs.  Bandages can be removed this evening.

## 2022-12-02 NOTE — PROCEDURES
Procedures  CC: right knee pain    42 y.o. Male presents today for Iovera procedure for right knee pain.    INFORMED CONSENT: I verify that I personally obtained Pau Brito's consent which was signed and uploaded into [facility].     PAIN SCORE:  Pre-procedure:  8/10  Gait: Antalgic    Inspection/Palpation:   +Skin warm and dry without open wounds or erythema  -Rubor   -Calor    Procedure Note Iovera:    DATE OF PROCEDURE:  12/02/2022    PREOPERATIVE DIAGNOSIS: right knee pain.     POSTOPERATIVE DIAGNOSIS: right knee pain.     PROCEDURE: Iovera treatment of anterior femoral cutaneous nerve, Lateral femoral cut nerve, and superior and inferior branches of infrapatellar saphenous nerve using at least 4+ different punctures to treat all 4 nerves. (cpt 64640 x4)    COMPLICATIONS: None.     IMPLANTS:  None    ESTIMATED BLOOD LOSS:  < 5cc    SPECIMENS REMOVED:  None    ANESTHESIA: 20cc Local xylocaine with epinephrine    INDICATIONS FOR PROCEDURE: This is a 42 y.o. male with longstanding knee pain. They have failed non operative management including injections.I discussed a new treatment therapy called Iovera, which is cryotherapy, to provide symptomatic relief along the sensory distribution of the infrapatellar tendon branch of the saphenous nerve  and AFCN. The patient elected to move forward with this  We did discuss the fact that this is a fairly novel procedure and there is very limited scientific data around this.  However, it FDA approved.  The patient was given patient information and literature to review prior to the procedure as well.  Based on this, the patient agreed to move forward with doing the procedure.    Time was spent discussing the cryoanalgesia procedure Iovera with the patient.  Risks and benefits were also discussed with patient.  X-rays were reviewed to use as a diagram to explain procedure. A detailed description of landmarks and placement of anesthetic used during procedure were also explained to  patient.  Contraindications for procedure were discussed with patient.    CONSENT:  Verbal and written consent were obtained from the patient.     PROCEDURE:    A surgical time out was performed, including verification of patient ID, procedure to be performed, site and side, availability of information and equipment, review of safety issues, and the patients agreement and consent.  The patient was placed supine on the exam table and the proximal medial aspect of the right tibia and anterior aspect of distal femur was prepped with sterile Chlorhexidine and alcohol.  A line was drawn extending approximately 5 cm medial to inferior pole of the patella distally to a point approximately 5 cm medial to the tibial tubercle.  A second line was drawn in a medial to lateral direction the width of the patella approximately 15 cm proximal to the patella. We then infiltrated the skin with lidocaine with epinephrine along both lines using a 25g needle. We then introduced the Iovera device along these lines and this device penetrated the skin, creating cryotherapy to the superior and inferior branches of the infrapatellar saphenous nerve, a third treatment to the anterior femoral cutaneous nerve, and fourth LFCN. 9 punctures of the skin were made to treat the superior and inferior branches of the ISN and another 12 punctures were made to treat the AFCN and LFCN. There were a total of 4 nerves treated with iovera. The patient tolerated the procedure well with no problems.    PAIN SCORES:  Pre-procedure:  8/10  Post-procedure:  0/10  Gait: Non-Antalgic      ASSESSMENT:      ICD-10-CM ICD-9-CM   1. Primary osteoarthritis of right knee  M17.11 715.16   2. Chronic pain of right knee  M25.561 719.46    G89.29 338.29         PLAN:    All questions were answered to the best of my ability and all concerns were addressed at this time.    This note is dictated using the M*[employer]ise[employer] Fluency Direct word recognition program. There are word  recognition mistakes that are occasionally missed on review.

## 2022-12-30 ENCOUNTER — HOSPITAL ENCOUNTER (OUTPATIENT)
Dept: RADIOLOGY | Facility: HOSPITAL | Age: 42
Discharge: HOME | End: 2022-12-30
Attending: PHYSICIAN ASSISTANT
Payer: MEDICAID

## 2022-12-30 ENCOUNTER — OFFICE VISIT (OUTPATIENT)
Dept: SPORTS MEDICINE | Facility: CLINIC | Age: 42
End: 2022-12-30
Payer: MEDICAID

## 2022-12-30 VITALS
SYSTOLIC BLOOD PRESSURE: 122 MMHG | HEIGHT: 74 IN | DIASTOLIC BLOOD PRESSURE: 84 MMHG | HEART RATE: 62 BPM | WEIGHT: 251 LBS | BODY MASS INDEX: 32.21 KG/M2

## 2022-12-30 DIAGNOSIS — M25.561 CHRONIC PAIN OF RIGHT KNEE: ICD-10-CM

## 2022-12-30 DIAGNOSIS — M17.11 PRIMARY OSTEOARTHRITIS OF RIGHT KNEE: Primary | ICD-10-CM

## 2022-12-30 DIAGNOSIS — M23.91 INTERNAL DERANGEMENT OF RIGHT KNEE: ICD-10-CM

## 2022-12-30 DIAGNOSIS — G89.29 CHRONIC PAIN OF RIGHT KNEE: ICD-10-CM

## 2022-12-30 PROCEDURE — 99999 PR PBB SHADOW E&M-EST. PATIENT-LVL III: ICD-10-PCS | Mod: PBBFAC,,, | Performed by: PHYSICIAN ASSISTANT

## 2022-12-30 PROCEDURE — 99214 OFFICE O/P EST MOD 30 MIN: CPT | Mod: S$PBB,,, | Performed by: PHYSICIAN ASSISTANT

## 2022-12-30 PROCEDURE — 73564 XR KNEE ORTHO BILAT WITH FLEXION: ICD-10-PCS | Mod: 26,50,, | Performed by: RADIOLOGY

## 2022-12-30 PROCEDURE — 3079F DIAST BP 80-89 MM HG: CPT | Mod: CPTII,,, | Performed by: PHYSICIAN ASSISTANT

## 2022-12-30 PROCEDURE — 1159F MED LIST DOCD IN RCRD: CPT | Mod: CPTII,,, | Performed by: PHYSICIAN ASSISTANT

## 2022-12-30 PROCEDURE — 1160F PR REVIEW ALL MEDS BY PRESCRIBER/CLIN PHARMACIST DOCUMENTED: ICD-10-PCS | Mod: CPTII,,, | Performed by: PHYSICIAN ASSISTANT

## 2022-12-30 PROCEDURE — 99999 PR PBB SHADOW E&M-EST. PATIENT-LVL III: CPT | Mod: PBBFAC,,, | Performed by: PHYSICIAN ASSISTANT

## 2022-12-30 PROCEDURE — 73564 X-RAY EXAM KNEE 4 OR MORE: CPT | Mod: 26,50,, | Performed by: RADIOLOGY

## 2022-12-30 PROCEDURE — 3008F BODY MASS INDEX DOCD: CPT | Mod: CPTII,,, | Performed by: PHYSICIAN ASSISTANT

## 2022-12-30 PROCEDURE — 99213 OFFICE O/P EST LOW 20 MIN: CPT | Mod: PBBFAC | Performed by: PHYSICIAN ASSISTANT

## 2022-12-30 PROCEDURE — 1160F RVW MEDS BY RX/DR IN RCRD: CPT | Mod: CPTII,,, | Performed by: PHYSICIAN ASSISTANT

## 2022-12-30 PROCEDURE — 3074F PR MOST RECENT SYSTOLIC BLOOD PRESSURE < 130 MM HG: ICD-10-PCS | Mod: CPTII,,, | Performed by: PHYSICIAN ASSISTANT

## 2022-12-30 PROCEDURE — 3008F PR BODY MASS INDEX (BMI) DOCUMENTED: ICD-10-PCS | Mod: CPTII,,, | Performed by: PHYSICIAN ASSISTANT

## 2022-12-30 PROCEDURE — 3074F SYST BP LT 130 MM HG: CPT | Mod: CPTII,,, | Performed by: PHYSICIAN ASSISTANT

## 2022-12-30 PROCEDURE — 3079F PR MOST RECENT DIASTOLIC BLOOD PRESSURE 80-89 MM HG: ICD-10-PCS | Mod: CPTII,,, | Performed by: PHYSICIAN ASSISTANT

## 2022-12-30 PROCEDURE — 99214 PR OFFICE/OUTPT VISIT, EST, LEVL IV, 30-39 MIN: ICD-10-PCS | Mod: S$PBB,,, | Performed by: PHYSICIAN ASSISTANT

## 2022-12-30 PROCEDURE — 1159F PR MEDICATION LIST DOCUMENTED IN MEDICAL RECORD: ICD-10-PCS | Mod: CPTII,,, | Performed by: PHYSICIAN ASSISTANT

## 2022-12-30 PROCEDURE — 73564 X-RAY EXAM KNEE 4 OR MORE: CPT | Mod: TC,50

## 2022-12-30 RX ORDER — MELOXICAM 15 MG/1
15 TABLET ORAL DAILY
Qty: 30 TABLET | Refills: 2 | Status: SHIPPED | OUTPATIENT
Start: 2022-12-30 | End: 2023-07-17

## 2022-12-30 NOTE — PROGRESS NOTES
Subjective:          Chief Complaint: Pau Brito is a 42 y.o. male who had concerns including Pain of the Right Knee.       Pau Brito is a 41 y.o. male  here for follow up right knee. He recently received the right knee Iovera procedure on 12/2/22.  He previously received a right knee Zilretta injection on 02/18/22 and 8/17/22 with 12 weeks relief of pain.  He has been also been receiving a short acting CSI every 6 months as well. Pain at rest is 0/10. Pain with walking is 7/10. He is no longer feeling the throbbing pain at rest that he was feeling prior to the Iovera procedure. He would still like to receive the Zilretta injection again in February.    The pain started 10 years ago after pivot/twist mechanism of injury and is becoming progressively worse. He notes he had an ACL tear and surgery 10+ years ago and continues to have pain and disability. Pain is located over (points to) medial, lateral and anterior.He has taken NSAIDS including ibuprofen with mild improvement previous treatments. Pain is affecting ADLs and limiting desired level of activity.   No relief with Mobic. Pain is 0/10 at rest and 10/10 at its worst.      He has been using his visco skin compression sleeve during physical activity that includes shooting basketball a few times a week. He has noticed recent right knee swelling but denies weakness or numbness.     Mechanical symptoms:locking and swelling  Subjective instability: (--)   Worse with activity and walking  Better with OTC NSAIDs  Nocturnal symptoms: (+)        Pain  Associated symptoms include joint swelling. Pertinent negatives include no abdominal pain, chest pain, chills, congestion, coughing, fever, nausea, numbness, rash, sore throat or vomiting.     Review of Systems   Constitutional: Negative for chills and fever.   HENT:  Negative for congestion and sore throat.    Eyes:  Negative for discharge and double vision.   Cardiovascular:  Negative for chest pain,  palpitations and syncope.   Respiratory:  Negative for cough and shortness of breath.    Endocrine: Negative for cold intolerance and heat intolerance.   Skin:  Negative for dry skin and rash.   Musculoskeletal:  Positive for joint pain, joint swelling and stiffness.   Gastrointestinal:  Negative for abdominal pain, nausea and vomiting.   Neurological:  Negative for focal weakness, numbness and paresthesias.     Pain Related Questions  Over the past 3 days, what was your average pain during activity? (I.e. running, jogging, walking, climbing stairs, getting dressed, ect.): 10  Over the past 3 days, what was your highest pain level?: 10  Over the past 3 days, what was your lowest pain level? : 8    Other  Was the patient's HEIGHT measured or patient reported?: Patient Reported  Was the patient's WEIGHT measured or patient reported?: Measured      Objective:        General: Pau is well-developed, well-nourished, appears stated age, in no acute distress, alert and oriented to time, place and person.     General    Vitals reviewed.  Constitutional: He is oriented to person, place, and time. He appears well-developed and well-nourished. No distress.   HENT:   Mouth/Throat: No oropharyngeal exudate.   Eyes: Right eye exhibits no discharge. Left eye exhibits no discharge.   Pulmonary/Chest: Effort normal and breath sounds normal. No respiratory distress.   Neurological: He is alert and oriented to person, place, and time. He has normal reflexes. No cranial nerve deficit. Coordination normal.   Psychiatric: He has a normal mood and affect. His behavior is normal. Judgment and thought content normal.     General Musculoskeletal Exam   Gait: abnormal and antalgic       Right Knee Exam     Inspection   Erythema: absent  Scars: present  Swelling: present  Effusion: present  Deformity: absent  Bruising: absent    Tenderness   The patient is tender to palpation of the medial joint line, lateral joint line and  patella.    Crepitus   The patient has crepitus of the patella (moderate).    Range of Motion   Extension:  10 abnormal   Flexion:  120 abnormal     Tests   Meniscus   Mara:  Medial - negative Lateral - negative  Ligament Examination   Lachman: normal (-1 to 2mm)   PCL-Posterior Drawer: normal (0 to 2mm)     MCL - Valgus: normal (0 to 2mm)  LCL - Varus: normal  Pivot Shift: normal (Equal)  Reverse Pivot Shift: normal (Equal)  Posterior Sag Test: negative  Posterolateral Corner: stable  Patella   Patellar apprehension: negative  Passive Patellar Tilt: neutral  Patellar Tracking: normal  Patellar Glide (quadrants): Lateral - 1   Medial - 2  Q-Angle at 90 degrees: normal  Patellar Grind: negative  J-Sign: none    Other   Meniscal Cyst: absent  Popliteal (Baker's) Cyst: absent  Sensation: normal    Left Knee Exam     Inspection   Erythema: absent  Scars: absent  Swelling: absent  Effusion: absent  Deformity: absent  Bruising: absent    Tenderness   The patient is experiencing no tenderness.     Range of Motion   Extension:  0   Flexion:  140 abnormal     Tests   Meniscus   Mara:  Medial - negative Lateral - negative  Stability   Lachman: normal (-1 to 2mm)   PCL-Posterior Drawer: normal (0 to 2mm)  MCL - Valgus: normal (0 to 2mm)  LCL - Varus: normal (0 to 2mm)  Pivot Shift: normal (Equal)  Reverse Pivot Shift: normal (Equal)  Posterior Sag Test: negative  Posterolateral Corner: stable  Patella   Patellar apprehension: negative  Passive Patellar Tilt: neutral  Patellar Tracking: normal  Patellar Glide (Quadrants): Lateral - 1 Medial - 2  Q-Angle at 90 degrees: normal  Patellar Grind: negative  J-Sign: J sign absent    Other   Meniscal Cyst: absent  Popliteal (Baker's) Cyst: absent  Sensation: normal    Right Hip Exam     Tests   Jillian: negative  Left Hip Exam     Tests   Jillian: negative          Muscle Strength   Right Lower Extremity   Hip Abduction: 5/5   Quadriceps:  4/5   Hamstrin/5   Left Lower Extremity    Hip Abduction: 5/5   Quadriceps:  5/5   Hamstrin/5     Reflexes     Left Side  Achilles:  2+  Quadriceps:  2+    Right Side   Achilles:  2+  Quadriceps:  2+    Vascular Exam     Right Pulses  Dorsalis Pedis:      2+  Posterior Tibial:      2+        Left Pulses  Dorsalis Pedis:      2+  Posterior Tibial:      2+    Radiographic Findings: 22  Bilateral knees:  FINDINGS:  Bilateral knees:     Left: There is a quadriceps insertion spur on the patella.  No fracture dislocation bone destruction or OCD seen.     Right: There is DJD and a varus deformity.  There is a joint effusion.  No acute fracture dislocation bone destruction or OCD seen.        Assessment:       Encounter Diagnoses   Name Primary?    Primary osteoarthritis of right knee Yes    Internal derangement of right knee     Chronic pain of right knee             Plan:         1. IKDC, SF-12 and KOOS was not filled out today in clinic.     RTC in 3 months for right knee Zilretta injection. He will not fill out IKDC, SF-12 and KOOS on return.    2.  Mobic 15mg once daily prn pain    3. Continue HEP patellofemoral and quad strengthening program     4. Continue visco skin as needed    5. I made the decision to obtain old records of the patient including previous notes and imaging. New imaging was ordered today of the extremity or extremities evaluated. I independently reviewed and interpreted the radiographs and/or MRIs today as well as prior imaging. Reviewed with patient in detail.    6. Visit Summary: At this time, Mr. Brito has a long standing history of knee pain on the RIGHT side. He is unsure who reconstructed his ACL 10+ years ago at [facility]. Given a brochure on Depuy total knee by Dr. Duffy. Patient is not interested in TKA at this time. He would like to continue with Zilretta and Iovera.                     [facility] patient questionnaires have been collected today.

## 2023-01-06 ENCOUNTER — TELEPHONE (OUTPATIENT)
Dept: SPORTS MEDICINE | Facility: CLINIC | Age: 43
End: 2023-01-06
Payer: MEDICAID

## 2023-01-06 DIAGNOSIS — M17.11 PRIMARY OSTEOARTHRITIS OF RIGHT KNEE: Primary | ICD-10-CM

## 2023-01-06 NOTE — TELEPHONE ENCOUNTER
----- Message from Selam Byrnes PA-C sent at 1/6/2023  7:59 AM CST -----  Please let him know that I placed the referral to pain management. They should call to schedule him.  ----- Message -----  From: Víctor Morales MA  Sent: 1/5/2023   4:53 PM CST  To: Selam Byrnes PA-C    Pt requesting a ref to pain management..   ----- Message -----  From: Priya Contreras  Sent: 1/5/2023   4:50 PM CST  To: [facility] Staff    Type:  Patient Requesting Referral    Who Called:Pau  Does the patient already have the specialty appointment scheduled?:NO  If yes, what is the date of that appointment?:  Referral to What Specialty:Pain management  Reason for Referral:knee pain  Does the patient want the referral with a specific physician?:  [facility]  Patient Requesting a Response?:Yes  Would the patient rather a call back or a response via [facility]? Call back  Best Call Back Number:[phone]  Additional Information:

## 2023-02-14 ENCOUNTER — TELEPHONE (OUTPATIENT)
Dept: SPORTS MEDICINE | Facility: CLINIC | Age: 43
End: 2023-02-14
Payer: MEDICAID

## 2023-02-14 NOTE — TELEPHONE ENCOUNTER
Called the patient regarding appointment on 2/15 with Selam Byrnes PA-C for injection. Left a detailed voicemail instructed the patient to please arrive 30 minutes early to sign consents and go over procedure details.

## 2023-02-15 ENCOUNTER — OFFICE VISIT (OUTPATIENT)
Dept: SPORTS MEDICINE | Facility: CLINIC | Age: 43
End: 2023-02-15
Payer: MEDICAID

## 2023-02-15 VITALS
DIASTOLIC BLOOD PRESSURE: 89 MMHG | HEIGHT: 74 IN | SYSTOLIC BLOOD PRESSURE: 133 MMHG | WEIGHT: 250 LBS | BODY MASS INDEX: 32.08 KG/M2 | HEART RATE: 71 BPM

## 2023-02-15 DIAGNOSIS — M17.11 PRIMARY OSTEOARTHRITIS OF RIGHT KNEE: Primary | ICD-10-CM

## 2023-02-15 DIAGNOSIS — M25.561 CHRONIC PAIN OF RIGHT KNEE: ICD-10-CM

## 2023-02-15 DIAGNOSIS — G89.29 CHRONIC PAIN OF RIGHT KNEE: ICD-10-CM

## 2023-02-15 PROCEDURE — 1159F MED LIST DOCD IN RCRD: CPT | Mod: CPTII,,, | Performed by: PHYSICIAN ASSISTANT

## 2023-02-15 PROCEDURE — 99213 OFFICE O/P EST LOW 20 MIN: CPT | Mod: PBBFAC | Performed by: PHYSICIAN ASSISTANT

## 2023-02-15 PROCEDURE — 99999 PR PBB SHADOW E&M-EST. PATIENT-LVL III: CPT | Mod: PBBFAC,,, | Performed by: PHYSICIAN ASSISTANT

## 2023-02-15 PROCEDURE — 20610 PR DRAIN/INJECT LARGE JOINT/BURSA: ICD-10-PCS | Mod: S$PBB,RT,, | Performed by: PHYSICIAN ASSISTANT

## 2023-02-15 PROCEDURE — 3079F PR MOST RECENT DIASTOLIC BLOOD PRESSURE 80-89 MM HG: ICD-10-PCS | Mod: CPTII,,, | Performed by: PHYSICIAN ASSISTANT

## 2023-02-15 PROCEDURE — 1160F PR REVIEW ALL MEDS BY PRESCRIBER/CLIN PHARMACIST DOCUMENTED: ICD-10-PCS | Mod: CPTII,,, | Performed by: PHYSICIAN ASSISTANT

## 2023-02-15 PROCEDURE — 3075F PR MOST RECENT SYSTOLIC BLOOD PRESS GE 130-139MM HG: ICD-10-PCS | Mod: CPTII,,, | Performed by: PHYSICIAN ASSISTANT

## 2023-02-15 PROCEDURE — 99499 UNLISTED E&M SERVICE: CPT | Mod: S$PBB,,, | Performed by: PHYSICIAN ASSISTANT

## 2023-02-15 PROCEDURE — 3079F DIAST BP 80-89 MM HG: CPT | Mod: CPTII,,, | Performed by: PHYSICIAN ASSISTANT

## 2023-02-15 PROCEDURE — 99499 NO LOS: ICD-10-PCS | Mod: S$PBB,,, | Performed by: PHYSICIAN ASSISTANT

## 2023-02-15 PROCEDURE — 1159F PR MEDICATION LIST DOCUMENTED IN MEDICAL RECORD: ICD-10-PCS | Mod: CPTII,,, | Performed by: PHYSICIAN ASSISTANT

## 2023-02-15 PROCEDURE — 20610 DRAIN/INJ JOINT/BURSA W/O US: CPT | Mod: S$PBB,RT,, | Performed by: PHYSICIAN ASSISTANT

## 2023-02-15 PROCEDURE — 1160F RVW MEDS BY RX/DR IN RCRD: CPT | Mod: CPTII,,, | Performed by: PHYSICIAN ASSISTANT

## 2023-02-15 PROCEDURE — 3008F PR BODY MASS INDEX (BMI) DOCUMENTED: ICD-10-PCS | Mod: CPTII,,, | Performed by: PHYSICIAN ASSISTANT

## 2023-02-15 PROCEDURE — 3075F SYST BP GE 130 - 139MM HG: CPT | Mod: CPTII,,, | Performed by: PHYSICIAN ASSISTANT

## 2023-02-15 PROCEDURE — 20610 DRAIN/INJ JOINT/BURSA W/O US: CPT | Mod: PBBFAC,RT | Performed by: PHYSICIAN ASSISTANT

## 2023-02-15 PROCEDURE — 3008F BODY MASS INDEX DOCD: CPT | Mod: CPTII,,, | Performed by: PHYSICIAN ASSISTANT

## 2023-02-15 PROCEDURE — 99999 PR PBB SHADOW E&M-EST. PATIENT-LVL III: ICD-10-PCS | Mod: PBBFAC,,, | Performed by: PHYSICIAN ASSISTANT

## 2023-02-15 RX ADMIN — Medication 32 MG: at 10:02

## 2023-02-15 NOTE — PROGRESS NOTES
Patient is here for follow up of right knee arthritis. Pt is requesting right knee Zilretta injection. [facility]. Has failed other conservative modalities including NSAIDS, activity modification, weight loss.    The prior shot was tolerated well.    PHYSICAL EXAMINATION:     General: The patient is alert and oriented x 3. Mood is pleasant.   Observation of ears, eyes and nose reveals no gross abnormalities. No   labored breathing observed.     No signs of infection or adverse reaction to knee.    PROCEDURE NOTE:  Aspiration/Injection Procedure right knee    A time out was performed, including verification of patient ID, procedure, site and side, availability of information and equipment, review of safety issues, and agreement with consent, the procedure site was marked.    Aspiration:  After time out was performed, the patient was prepped aseptically with povidone-iodine swabsticks. 47cc's of normal joint fluid was aspirated from the Superolateral  aspect of the right Knee Joint using a 22g x 1.5 needle in sterile fashion without complication.     Injection:  Following aspiration, a diagnostic and therapeutic injection of 5cc Zilretta was given under sterile technique in the supine position.     Pau Brito had no adverse reactions to the medication. Pain decreased. He was instructed to apply ice to the joint for 20 minutes and avoid strenuous activities for 24-36 hours following the injection. He was warned of possible blood sugar and/or blood pressure changes during that time. Following that time, he can resume regular activities.      RTC in 6 months for possible repeat Zilretta injection.  Will perform right knee Iovera in May 2023. Order placed and scheduled for May 5.    All questions were answered, pt will contact us for questions or concerns in the interim.

## 2023-05-01 ENCOUNTER — TELEPHONE (OUTPATIENT)
Dept: SPORTS MEDICINE | Facility: CLINIC | Age: 43
End: 2023-05-01
Payer: MEDICAID

## 2023-05-01 NOTE — TELEPHONE ENCOUNTER
Called the patient in regards to appointment on 5/5 with Selam Byrnes PA-C. The patient is schedule for Iovera, which is an hour long procedure. The patient is to arrive 30 minutes early for the procedure.

## 2023-05-04 ENCOUNTER — PATIENT MESSAGE (OUTPATIENT)
Dept: SPORTS MEDICINE | Facility: CLINIC | Age: 43
End: 2023-05-04
Payer: MEDICAID

## 2023-05-04 ENCOUNTER — TELEPHONE (OUTPATIENT)
Dept: SPORTS MEDICINE | Facility: CLINIC | Age: 43
End: 2023-05-04
Payer: MEDICAID

## 2023-05-04 NOTE — TELEPHONE ENCOUNTER
Called the patient to confirm his appointment tomorrow for Iovera procedure. The procedure is an hour long. If the patient is late the provider will have to reschedule to avoid appointment interfering with following appointments. Please call the office at [phone] to confirm the appointment with Selam Byrnes PA-C

## 2023-05-26 ENCOUNTER — TELEPHONE (OUTPATIENT)
Dept: SPORTS MEDICINE | Facility: CLINIC | Age: 43
End: 2023-05-26
Payer: MEDICAID

## 2023-05-26 NOTE — TELEPHONE ENCOUNTER
----- Message from Raiza Segura sent at 5/26/2023  3:41 PM CDT -----  Regarding: Pt needs to reschedule procedure, please call back  Contact: @[phone]  Pt needs to reschedule procedure, please call back @[phone]

## 2023-05-26 NOTE — TELEPHONE ENCOUNTER
Spoke to the patient in regards to rescheduling Iovera procedure. While on the call I informed the patient that his provider was out of the office for the day. I will inform Soniya of his request upon her arrival on Monday. I asked that he allow her time to review her schedule and contact him with scheduling options.

## 2023-05-29 ENCOUNTER — TELEPHONE (OUTPATIENT)
Dept: SPORTS MEDICINE | Facility: CLINIC | Age: 43
End: 2023-05-29
Payer: MEDICAID

## 2023-05-29 NOTE — TELEPHONE ENCOUNTER
Called the patient to schedule Iovera procedure. Patient can call the office at [phone] to schedule with Selam Byrnes PA-C.

## 2023-06-02 ENCOUNTER — PROCEDURE VISIT (OUTPATIENT)
Dept: SPORTS MEDICINE | Facility: CLINIC | Age: 43
End: 2023-06-02
Payer: MEDICAID

## 2023-06-02 VITALS
HEIGHT: 77 IN | WEIGHT: 250 LBS | DIASTOLIC BLOOD PRESSURE: 92 MMHG | SYSTOLIC BLOOD PRESSURE: 135 MMHG | HEART RATE: 68 BPM | BODY MASS INDEX: 29.52 KG/M2

## 2023-06-02 DIAGNOSIS — M17.11 PRIMARY OSTEOARTHRITIS OF RIGHT KNEE: Primary | ICD-10-CM

## 2023-06-02 DIAGNOSIS — M25.561 CHRONIC PAIN OF RIGHT KNEE: Primary | ICD-10-CM

## 2023-06-02 DIAGNOSIS — G89.29 CHRONIC PAIN OF RIGHT KNEE: Primary | ICD-10-CM

## 2023-06-02 PROCEDURE — 64640 INJECTION TREATMENT OF NERVE: CPT | Mod: S$PBB,RT,, | Performed by: PHYSICIAN ASSISTANT

## 2023-06-02 PROCEDURE — 64640 INJECTION TREATMENT OF NERVE: CPT | Mod: PBBFAC,RT | Performed by: PHYSICIAN ASSISTANT

## 2023-06-02 PROCEDURE — 64640 PR DESTRUCT BY NEURO AGENT; OTHER PERIPH NERVE: ICD-10-PCS | Mod: S$PBB,RT,, | Performed by: PHYSICIAN ASSISTANT

## 2023-06-02 PROCEDURE — 99499 NO LOS: ICD-10-PCS | Mod: S$PBB,,, | Performed by: PHYSICIAN ASSISTANT

## 2023-06-02 PROCEDURE — 99499 UNLISTED E&M SERVICE: CPT | Mod: S$PBB,,, | Performed by: PHYSICIAN ASSISTANT

## 2023-06-02 NOTE — PROCEDURES
Procedures  CC: right knee pain    42 y.o. Male presents today for Iovera procedure for right knee pain.    INFORMED CONSENT: I verify that I personally obtained Pau Brito's consent which was signed and uploaded into [facility].     PAIN SCORE:  Pre-procedure:  9/10  Gait: Antalgic    Inspection/Palpation:   +Skin warm and dry without open wounds or erythema  -Rubor   -Calor    Procedure Note Iovera:    DATE OF PROCEDURE:  06/02/2023    PREOPERATIVE DIAGNOSIS: right knee pain.     POSTOPERATIVE DIAGNOSIS: right knee pain.     PROCEDURE: Iovera treatment of anterior femoral cutaneous nerve, Lateral femoral cut nerve, and superior and inferior branches of infrapatellar saphenous nerve using at least 4+ different punctures to treat all 4 nerves. (cpt 64640 x4)    COMPLICATIONS: None.     IMPLANTS:  None    ESTIMATED BLOOD LOSS:  < 5cc    SPECIMENS REMOVED:  None    ANESTHESIA: 20cc Local xylocaine with epinephrine    INDICATIONS FOR PROCEDURE: This is a 42 y.o. male with longstanding knee pain. They have failed non operative management including injections.I discussed a new treatment therapy called Iovera, which is cryotherapy, to provide symptomatic relief along the sensory distribution of the infrapatellar tendon branch of the saphenous nerve  and AFCN. The patient elected to move forward with this  We did discuss the fact that this is a fairly novel procedure and there is very limited scientific data around this.  However, it FDA approved.  The patient was given patient information and literature to review prior to the procedure as well.  Based on this, the patient agreed to move forward with doing the procedure.    Time was spent discussing the cryoanalgesia procedure Iovera with the patient.  Risks and benefits were also discussed with patient.  X-rays were reviewed to use as a diagram to explain procedure. A detailed description of landmarks and placement of anesthetic used during procedure were also explained to  patient.  Contraindications for procedure were discussed with patient.    CONSENT:  Verbal and written consent were obtained from the patient.     PROCEDURE:    A surgical time out was performed, including verification of patient ID, procedure to be performed, site and side, availability of information and equipment, review of safety issues, and the patients agreement and consent.  The patient was placed supine on the exam table and the proximal medial aspect of the right tibia and anterior aspect of distal femur was prepped with sterile Chlorhexidine and alcohol.  A line was drawn extending approximately 5 cm medial to inferior pole of the patella distally to a point approximately 5 cm medial to the tibial tubercle.  A second line was drawn in a medial to lateral direction the width of the patella approximately 15 cm proximal to the patella. We then infiltrated the skin with lidocaine with epinephrine along both lines using a 25g needle. We then introduced the Iovera device along these lines and this device penetrated the skin, creating cryotherapy to the superior and inferior branches of the infrapatellar saphenous nerve, a third treatment to the anterior femoral cutaneous nerve, and fourth LFCN. 10 punctures of the skin were made to treat the superior and inferior branches of the ISN and another 12 punctures were made to treat the AFCN and LFCN. There were a total of 4 nerves treated with iovera. The patient tolerated the procedure well with no problems.    PAIN SCORES:  Pre-procedure:  9/10  Post-procedure:  2/10  Gait: Non-Antalgic      ASSESSMENT:      ICD-10-CM ICD-9-CM   1. Chronic pain of right knee  M25.561 719.46    G89.29 338.29         PLAN:    All questions were answered to the best of my ability and all concerns were addressed at this time.    This note is dictated using the M*Modal Fluency Direct word recognition program. There are word recognition mistakes that are occasionally missed on  review.

## 2023-07-17 DIAGNOSIS — M23.91 INTERNAL DERANGEMENT OF RIGHT KNEE: ICD-10-CM

## 2023-07-17 DIAGNOSIS — M17.11 PRIMARY OSTEOARTHRITIS OF RIGHT KNEE: ICD-10-CM

## 2023-07-17 RX ORDER — MELOXICAM 15 MG/1
TABLET ORAL
Qty: 30 TABLET | Refills: 2 | Status: SHIPPED | OUTPATIENT
Start: 2023-07-17 | End: 2023-12-13

## 2023-08-03 ENCOUNTER — TELEPHONE (OUTPATIENT)
Dept: SPORTS MEDICINE | Facility: CLINIC | Age: 43
End: 2023-08-03
Payer: MEDICAID

## 2023-08-03 NOTE — TELEPHONE ENCOUNTER
Spoke to the patient in regards to their appointment tomorrow. While on the call I informed the patient that their injection is approved. Before ending the call I confirmed appointment date, time, and location.

## 2023-08-04 ENCOUNTER — OFFICE VISIT (OUTPATIENT)
Dept: SPORTS MEDICINE | Facility: CLINIC | Age: 43
End: 2023-08-04
Payer: MEDICAID

## 2023-08-04 VITALS
HEART RATE: 72 BPM | SYSTOLIC BLOOD PRESSURE: 128 MMHG | BODY MASS INDEX: 29.52 KG/M2 | HEIGHT: 77 IN | WEIGHT: 250 LBS | DIASTOLIC BLOOD PRESSURE: 81 MMHG

## 2023-08-04 DIAGNOSIS — M17.11 PRIMARY OSTEOARTHRITIS OF RIGHT KNEE: Primary | ICD-10-CM

## 2023-08-04 PROCEDURE — 99499 NO LOS: ICD-10-PCS | Mod: S$PBB,,, | Performed by: PHYSICIAN ASSISTANT

## 2023-08-04 PROCEDURE — 99999 PR PBB SHADOW E&M-EST. PATIENT-LVL III: ICD-10-PCS | Mod: PBBFAC,,, | Performed by: PHYSICIAN ASSISTANT

## 2023-08-04 PROCEDURE — 20610 PR DRAIN/INJECT LARGE JOINT/BURSA: ICD-10-PCS | Mod: S$PBB,RT,, | Performed by: PHYSICIAN ASSISTANT

## 2023-08-04 PROCEDURE — 99999PBSHW PR PBB SHADOW TECHNICAL ONLY FILED TO HB: ICD-10-PCS | Mod: JZ,PBBFAC,,

## 2023-08-04 PROCEDURE — 20610 DRAIN/INJ JOINT/BURSA W/O US: CPT | Mod: PBBFAC | Performed by: PHYSICIAN ASSISTANT

## 2023-08-04 PROCEDURE — 99999 PR PBB SHADOW E&M-EST. PATIENT-LVL III: CPT | Mod: PBBFAC,,, | Performed by: PHYSICIAN ASSISTANT

## 2023-08-04 PROCEDURE — 99499 UNLISTED E&M SERVICE: CPT | Mod: S$PBB,,, | Performed by: PHYSICIAN ASSISTANT

## 2023-08-04 PROCEDURE — 20610 DRAIN/INJ JOINT/BURSA W/O US: CPT | Mod: S$PBB,RT,, | Performed by: PHYSICIAN ASSISTANT

## 2023-08-04 PROCEDURE — 99999PBSHW PR PBB SHADOW TECHNICAL ONLY FILED TO HB: Mod: JZ,PBBFAC,,

## 2023-08-04 PROCEDURE — 99213 OFFICE O/P EST LOW 20 MIN: CPT | Mod: PBBFAC,25 | Performed by: PHYSICIAN ASSISTANT

## 2023-08-04 RX ADMIN — Medication 32 MG: at 12:08

## 2023-08-04 NOTE — PROGRESS NOTES
Patient is here for follow up of right knee arthritis. Pt is requesting right knee Zilretta injection. [facility] reviewed per encounter record. Has failed other conservative modalities including NSAIDS, activity modification, weight loss.    The prior shot was tolerated well.    PHYSICAL EXAMINATION:     General: The patient is alert and oriented x 3. Mood is pleasant.   Observation of ears, eyes and nose reveals no gross abnormalities. No   labored breathing observed.     No signs of infection or adverse reaction to knee.    PROCEDURE NOTE:  Aspiration/Injection Procedure right knee    A time out was performed, including verification of patient ID, procedure, site and side, availability of information and equipment, review of safety issues, and agreement with consent, the procedure site was marked.    Aspiration:  After time out was performed, the patient was prepped aseptically with povidone-iodine swabsticks. 70cc's of normal joint fluid was aspirated from the Superolateral  aspect of the right Knee Joint using a 22g x 1.5 needle in sterile fashion without complication.     Injection:  Following aspiration, a diagnostic and therapeutic injection of 5cc Zilretta was given under sterile technique in the supine position.     Pau Brito had no adverse reactions to the medication. Pain decreased. He was instructed to apply ice to the joint for 20 minutes and avoid strenuous activities for 24-36 hours following the injection. He was warned of possible blood sugar and/or blood pressure changes during that time. Following that time, he can resume regular activities.      RTC in 6 months for possible repeat Zilretta injection.  08015 - Camryn, performed a custom orthotic / brace adjustment, fitting and training with the patient. The patient demonstrated understanding and proper care. This was performed for 15 minutes. Knee compression    All questions were answered, pt will contact us for questions or concerns in the  interim.

## 2023-10-30 ENCOUNTER — PATIENT MESSAGE (OUTPATIENT)
Dept: SPORTS MEDICINE | Facility: CLINIC | Age: 43
End: 2023-10-30
Payer: MEDICAID

## 2023-10-30 DIAGNOSIS — M17.11 PRIMARY OSTEOARTHRITIS OF RIGHT KNEE: Primary | ICD-10-CM

## 2023-11-20 ENCOUNTER — TELEPHONE (OUTPATIENT)
Dept: SPORTS MEDICINE | Facility: CLINIC | Age: 43
End: 2023-11-20
Payer: MEDICAID

## 2023-11-20 NOTE — TELEPHONE ENCOUNTER
Spoke to the patient in regards to Iovera procedure. While on the call with the patient I informed him that he is scheduled on 12/6 at 11:45 am with Selam Byrnes PA-C  for Zilretta injection. I informed the patient that the last order that was placed for injection not a procedure. I also informed the patient that his injection has been approved and I will verify with Selam Byrnes PA-C  if and when he can repeat the Iovera procedure.

## 2023-11-20 NOTE — TELEPHONE ENCOUNTER
----- Message from Genesis Riley sent at 11/20/2023 12:26 PM CST -----  Pt calling in regards to being able to receive the Iwona procedure , please call     Confirmed patient's contact info below:  Contact Name: Pau Brito  Phone Number: [phone]

## 2023-11-29 ENCOUNTER — TELEPHONE (OUTPATIENT)
Dept: SPORTS MEDICINE | Facility: CLINIC | Age: 43
End: 2023-11-29
Payer: MEDICAID

## 2023-11-29 NOTE — TELEPHONE ENCOUNTER
----- Message from Selam Byrnes PA-C sent at 11/29/2023 12:39 PM CST -----  Regarding: RE: SCHEDULE PROCEDURE  Contact: Linda Salas  Call him and confirm it is ZIlretta not iovera.  ----- Message -----  From: Paulo Amaya MA  Sent: 11/29/2023  11:18 AM CST  To: Selam Byrnes PA-C  Subject: FW: SCHEDULE PROCEDURE                           Hey,    This is his second request to schedule Iovera procedure. I don't recall us discussing repeating Iovera with him.      Paulo Amaya MA  Medical Assistant to Selam Byrnes PA-C   ----- Message -----  From: Xenia Castillo  Sent: 11/29/2023  10:34 AM CST  To: Soniya Doss Staff  Subject: SCHEDULE PROCEDURE                               Linda Salas stated pt is ready to schedule his procedure, ask for a call to schedule      Contact info  [phone] (home)

## 2023-11-29 NOTE — TELEPHONE ENCOUNTER
Spoke to the patient at approximately 2:35pm in regards to Iovera procedure. While on the call I informed the patient that his appointment on 12/6 is scheduled for Zilretta injection and during that appointment he can discuss Iovera options.

## 2023-12-06 ENCOUNTER — OFFICE VISIT (OUTPATIENT)
Dept: SPORTS MEDICINE | Facility: CLINIC | Age: 43
End: 2023-12-06
Payer: MEDICAID

## 2023-12-06 VITALS
WEIGHT: 246.94 LBS | BODY MASS INDEX: 29.16 KG/M2 | HEART RATE: 84 BPM | SYSTOLIC BLOOD PRESSURE: 147 MMHG | DIASTOLIC BLOOD PRESSURE: 96 MMHG | HEIGHT: 77 IN

## 2023-12-06 DIAGNOSIS — M17.12 PRIMARY OSTEOARTHRITIS OF LEFT KNEE: ICD-10-CM

## 2023-12-06 DIAGNOSIS — G89.29 CHRONIC PAIN OF RIGHT KNEE: ICD-10-CM

## 2023-12-06 DIAGNOSIS — M25.561 CHRONIC PAIN OF RIGHT KNEE: ICD-10-CM

## 2023-12-06 DIAGNOSIS — M17.11 PRIMARY OSTEOARTHRITIS OF RIGHT KNEE: Primary | ICD-10-CM

## 2023-12-06 PROCEDURE — 99499 NO LOS: ICD-10-PCS | Mod: S$PBB,,, | Performed by: PHYSICIAN ASSISTANT

## 2023-12-06 PROCEDURE — 20610 DRAIN/INJ JOINT/BURSA W/O US: CPT | Mod: S$PBB,RT,, | Performed by: PHYSICIAN ASSISTANT

## 2023-12-06 PROCEDURE — 99999 PR PBB SHADOW E&M-EST. PATIENT-LVL III: ICD-10-PCS | Mod: PBBFAC,,, | Performed by: PHYSICIAN ASSISTANT

## 2023-12-06 PROCEDURE — 20610 DRAIN/INJ JOINT/BURSA W/O US: CPT | Mod: PBBFAC,RT | Performed by: PHYSICIAN ASSISTANT

## 2023-12-06 PROCEDURE — 99999PBSHW PR PBB SHADOW TECHNICAL ONLY FILED TO HB: ICD-10-PCS | Mod: JZ,PBBFAC,,

## 2023-12-06 PROCEDURE — 99213 OFFICE O/P EST LOW 20 MIN: CPT | Mod: PBBFAC | Performed by: PHYSICIAN ASSISTANT

## 2023-12-06 PROCEDURE — 99999PBSHW PR PBB SHADOW TECHNICAL ONLY FILED TO HB: Mod: JZ,PBBFAC,,

## 2023-12-06 PROCEDURE — 99999 PR PBB SHADOW E&M-EST. PATIENT-LVL III: CPT | Mod: PBBFAC,,, | Performed by: PHYSICIAN ASSISTANT

## 2023-12-06 PROCEDURE — 20610 PR DRAIN/INJECT LARGE JOINT/BURSA: ICD-10-PCS | Mod: S$PBB,RT,, | Performed by: PHYSICIAN ASSISTANT

## 2023-12-06 PROCEDURE — 99499 UNLISTED E&M SERVICE: CPT | Mod: S$PBB,,, | Performed by: PHYSICIAN ASSISTANT

## 2023-12-06 RX ADMIN — Medication 32 MG: at 12:12

## 2023-12-06 NOTE — PROGRESS NOTES
Patient is here for follow up of right knee arthritis. Pt is requesting right knee Zilretta injection. [facility] reviewed per encounter record. Has failed other conservative modalities including NSAIDS, activity modification, weight loss.    The prior shot was tolerated well.    PHYSICAL EXAMINATION:     General: The patient is alert and oriented x 3. Mood is pleasant.   Observation of ears, eyes and nose reveals no gross abnormalities. No   labored breathing observed.     No signs of infection or adverse reaction to knee.    PROCEDURE NOTE:  Aspiration/Injection Procedure right knee    A time out was performed, including verification of patient ID, procedure, site and side, availability of information and equipment, review of safety issues, and agreement with consent, the procedure site was marked.    Aspiration:  After time out was performed, the patient was prepped aseptically with povidone-iodine swabsticks. 47cc's of normal joint fluid was aspirated from the Superolateral  aspect of the right Knee Joint using a 22g x 1.5 needle in sterile fashion without complication.     Injection:  Following aspiration, a diagnostic and therapeutic injection of 5cc Zilretta was given under sterile technique in the supine position.     Pau Brito had no adverse reactions to the medication. Pain decreased. He was instructed to apply ice to the joint for 20 minutes and avoid strenuous activities for 24-36 hours following the injection. He was warned of possible blood sugar and/or blood pressure changes during that time. Following that time, he can resume regular activities.      RTC in 6 months for possible repeat Zilretta injection.     placed for left knee Durolane injection.  Order for right knee Iovera placed to be performed in February 2024.    All questions were answered, pt will contact us for questions or concerns in the interim.

## 2023-12-08 DIAGNOSIS — M17.11 PRIMARY OSTEOARTHRITIS OF RIGHT KNEE: Primary | ICD-10-CM

## 2023-12-08 DIAGNOSIS — M25.561 CHRONIC PAIN OF RIGHT KNEE: ICD-10-CM

## 2023-12-08 DIAGNOSIS — M17.12 PRIMARY OSTEOARTHRITIS OF LEFT KNEE: ICD-10-CM

## 2023-12-08 DIAGNOSIS — G89.29 CHRONIC PAIN OF RIGHT KNEE: ICD-10-CM

## 2023-12-12 ENCOUNTER — PATIENT MESSAGE (OUTPATIENT)
Dept: SPORTS MEDICINE | Facility: CLINIC | Age: 43
End: 2023-12-12
Payer: MEDICAID

## 2023-12-26 ENCOUNTER — TELEPHONE (OUTPATIENT)
Dept: ORTHOPEDICS | Facility: CLINIC | Age: 43
End: 2023-12-26
Payer: MEDICAID

## 2023-12-26 NOTE — TELEPHONE ENCOUNTER
Spoke to the patient in regards to his appointment on 12/28. While on the call I informed the patient that Selam Byrnes PA-C  is out due to not feeling well. Offered the patient an appointment on 1/2 at 8:30 am. The patient accepted the appointment. I also informed the patient that his Iovera procedure has been approved and he is scheduled on 2/2 at 12pm at the [place] location.

## 2024-01-02 ENCOUNTER — TELEPHONE (OUTPATIENT)
Dept: SPORTS MEDICINE | Facility: CLINIC | Age: 44
End: 2024-01-02
Payer: COMMERCIAL

## 2024-01-02 NOTE — TELEPHONE ENCOUNTER
Spoke to the patient in regard to her injection appointment tomorrow with Selam Byrnes PA-C. While on the call I informed the patient that our office received notice to reschedule his appointment 15-20 days out due to pending authorization by his insurance company. The patient has been rescheduled to 1/29/24 at 9:45 am with Selam Byrnes PA-C at the [place] location.

## 2024-01-16 DIAGNOSIS — M17.12 PRIMARY OSTEOARTHRITIS OF LEFT KNEE: Primary | ICD-10-CM

## 2024-01-29 ENCOUNTER — HOSPITAL ENCOUNTER (OUTPATIENT)
Dept: RADIOLOGY | Facility: HOSPITAL | Age: 44
Discharge: HOME | End: 2024-01-29
Attending: PHYSICIAN ASSISTANT
Payer: MEDICAID

## 2024-01-29 ENCOUNTER — OFFICE VISIT (OUTPATIENT)
Dept: SPORTS MEDICINE | Facility: CLINIC | Age: 44
End: 2024-01-29
Payer: MEDICAID

## 2024-01-29 VITALS
HEART RATE: 67 BPM | BODY MASS INDEX: 30.59 KG/M2 | WEIGHT: 257.94 LBS | DIASTOLIC BLOOD PRESSURE: 82 MMHG | SYSTOLIC BLOOD PRESSURE: 123 MMHG

## 2024-01-29 DIAGNOSIS — M17.12 PRIMARY OSTEOARTHRITIS OF LEFT KNEE: Primary | ICD-10-CM

## 2024-01-29 DIAGNOSIS — M17.12 PRIMARY OSTEOARTHRITIS OF LEFT KNEE: ICD-10-CM

## 2024-01-29 DIAGNOSIS — M17.11 PRIMARY OSTEOARTHRITIS OF RIGHT KNEE: ICD-10-CM

## 2024-01-29 DIAGNOSIS — M25.461 EFFUSION OF RIGHT KNEE: ICD-10-CM

## 2024-01-29 PROCEDURE — 20610 DRAIN/INJ JOINT/BURSA W/O US: CPT | Mod: PBBFAC | Performed by: PHYSICIAN ASSISTANT

## 2024-01-29 PROCEDURE — 99213 OFFICE O/P EST LOW 20 MIN: CPT | Mod: PBBFAC,25 | Performed by: PHYSICIAN ASSISTANT

## 2024-01-29 PROCEDURE — 99999 PR PBB SHADOW E&M-EST. PATIENT-LVL III: CPT | Mod: PBBFAC,,, | Performed by: PHYSICIAN ASSISTANT

## 2024-01-29 PROCEDURE — 73564 X-RAY EXAM KNEE 4 OR MORE: CPT | Mod: TC,50

## 2024-01-29 PROCEDURE — 20610 DRAIN/INJ JOINT/BURSA W/O US: CPT | Mod: S$PBB,LT,, | Performed by: PHYSICIAN ASSISTANT

## 2024-01-29 PROCEDURE — 73564 X-RAY EXAM KNEE 4 OR MORE: CPT | Mod: 26,50,, | Performed by: RADIOLOGY

## 2024-01-29 PROCEDURE — 99999PBSHW PR PBB SHADOW TECHNICAL ONLY FILED TO HB: Mod: PBBFAC,,,

## 2024-01-29 PROCEDURE — 99214 OFFICE O/P EST MOD 30 MIN: CPT | Mod: 25,S$PBB,, | Performed by: PHYSICIAN ASSISTANT

## 2024-01-29 RX ORDER — TRIAMCINOLONE ACETONIDE 40 MG/ML
40 INJECTION, SUSPENSION INTRA-ARTICULAR; INTRAMUSCULAR
Status: COMPLETED | OUTPATIENT
Start: 2024-01-29 | End: 2024-01-29

## 2024-01-29 RX ORDER — BUPIVACAINE HYDROCHLORIDE 2.5 MG/ML
3 INJECTION, SOLUTION INFILTRATION; PERINEURAL
Status: COMPLETED | OUTPATIENT
Start: 2024-01-29 | End: 2024-01-29

## 2024-01-29 RX ADMIN — BUPIVACAINE HYDROCHLORIDE 7.5 MG: 2.5 INJECTION, SOLUTION INFILTRATION; PERINEURAL at 10:01

## 2024-01-29 RX ADMIN — TRIAMCINOLONE ACETONIDE 40 MG: 40 INJECTION, SUSPENSION INTRA-ARTICULAR; INTRAMUSCULAR at 10:01

## 2024-01-29 NOTE — PROGRESS NOTES
Subjective:          Chief Complaint: Pau Brito is a 43 y.o. male who had concerns including Pain of the Right Knee and Pain of the Left Knee.       Pau Brito is a 43 y.o. male  here for follow up bilateral knees. He last received the right knee Iovera procedure on 6/2/23 and right knee Zilretta injection on 12/6/23. He typically receives 12 weeks relief of pain from Zilretta but is starting to experience increased right knee pain. He knows that he needs a TKA but he is unable to do it at this time. He would like to continue with conservative treatment at this time.  Pain in right knee is 10/10. He is no longer feeling the throbbing pain at rest that he was feeling prior to the Iovera procedure. He scheduled to receive the Iovera procedure this week on his right knee. He takes Mobic 15mg as needed for pain. Previous ACL tear and surgery >15 years ago.  He has been using his visco skin compression sleeve during physical activity that includes shooting basketball a few times a week.     He reports a new left knee pain x 3-4 months. Denies a specific LELO. Pain is located in patellofemoral joint. Denies any previous history of injections or surgery to left knee. Denies mechanical symptoms or instability but report crepitus and swelling with increased activity.        Pain  Associated symptoms include joint swelling. Pertinent negatives include no abdominal pain, chest pain, chills, congestion, coughing, fever, nausea, numbness, rash, sore throat or vomiting.       Review of Systems   Constitutional: Negative for chills and fever.   HENT:  Negative for congestion and sore throat.    Eyes:  Negative for discharge and double vision.   Cardiovascular:  Negative for chest pain, palpitations and syncope.   Respiratory:  Negative for cough and shortness of breath.    Endocrine: Negative for cold intolerance and heat intolerance.   Skin:  Negative for dry skin and rash.   Musculoskeletal:  Positive for joint pain,  joint swelling and stiffness.   Gastrointestinal:  Negative for abdominal pain, nausea and vomiting.   Neurological:  Negative for focal weakness, numbness and paresthesias.                   Objective:        General: Pau is well-developed, well-nourished, appears stated age, in no acute distress, alert and oriented to time, place and person.     General    Vitals reviewed.  Constitutional: He is oriented to person, place, and time. He appears well-developed and well-nourished. No distress.   HENT:   Mouth/Throat: No oropharyngeal exudate.   Eyes: Right eye exhibits no discharge. Left eye exhibits no discharge.   Pulmonary/Chest: Effort normal and breath sounds normal. No respiratory distress.   Neurological: He is alert and oriented to person, place, and time. He has normal reflexes. No cranial nerve deficit. Coordination normal.   Psychiatric: He has a normal mood and affect. His behavior is normal. Judgment and thought content normal.     General Musculoskeletal Exam   Gait: abnormal and antalgic       Right Knee Exam     Inspection   Erythema: absent  Scars: present  Swelling: present  Effusion: present  Deformity: absent  Bruising: absent    Tenderness   The patient is tender to palpation of the patella.    Crepitus   The patient has crepitus of the patella (moderate).    Range of Motion   Extension:  10 abnormal   Flexion:  120 abnormal     Tests   Meniscus   Mara:  Medial - negative Lateral - negative  Ligament Examination   Lachman: normal (-1 to 2mm)   PCL-Posterior Drawer: normal (0 to 2mm)     MCL - Valgus: normal (0 to 2mm)  LCL - Varus: normal  Pivot Shift: normal (Equal)  Reverse Pivot Shift: normal (Equal)  Posterior Sag Test: negative  Posterolateral Corner: stable  Patella   Patellar apprehension: negative  Passive Patellar Tilt: neutral  Patellar Tracking: normal  Patellar Glide (quadrants): Lateral - 1   Medial - 2  Q-Angle at 90 degrees: normal  Patellar Grind: positive  J-Sign:  none    Other   Meniscal Cyst: absent  Popliteal (Baker's) Cyst: absent  Sensation: normal    Comments:  Severe genu varum    Left Knee Exam     Inspection   Erythema: absent  Scars: absent  Swelling: absent  Effusion: absent  Deformity: absent  Bruising: absent    Tenderness   The patient tender to palpation of the lateral joint line.    Crepitus   The patient has crepitus of the patella.    Range of Motion   Extension:  0 normal   Flexion:  140 normal     Tests   Meniscus   Mara:  Medial - negative Lateral - positive  Stability   Lachman: normal (-1 to 2mm)   PCL-Posterior Drawer: normal (0 to 2mm)  MCL - Valgus: normal (0 to 2mm)  LCL - Varus: normal (0 to 2mm)  Pivot Shift: normal (Equal)  Reverse Pivot Shift: normal (Equal)  Posterior Sag Test: negative  Posterolateral Corner: stable  Patella   Patellar apprehension: negative  Passive Patellar Tilt: neutral  Patellar Tracking: normal  Patellar Glide (Quadrants): Lateral - 1 Medial - 2  Q-Angle at 90 degrees: normal  Patellar Grind: negative  J-Sign: J sign absent    Other   Meniscal Cyst: absent  Popliteal (Baker's) Cyst: absent  Sensation: normal    Right Hip Exam     Tests   Jillian: negative  Left Hip Exam     Tests   Jillian: negative          Muscle Strength   Right Lower Extremity   Hip Abduction: 5/5   Quadriceps:  4/5 (pain)   Hamstrin/5   Left Lower Extremity   Hip Abduction: 5/5   Quadriceps:  5/5   Hamstrin/5     Reflexes     Left Side  Achilles:  2+  Quadriceps:  2+    Right Side   Achilles:  2+  Quadriceps:  2+    Vascular Exam     Right Pulses  Dorsalis Pedis:      2+  Posterior Tibial:      2+        Left Pulses  Dorsalis Pedis:      2+  Posterior Tibial:      2+      Radiographic Findings:  24  Bilateral knees:  FINDINGS:  Right knee: Significant DJD with osteophytosis and narrowing of the tibiofemoral joint spaces identified.  Suggestion of a possible ACL repair.  No acute fracture or bone destruction.     Left knee: DJD with  osteophytosis arising from the patella as before.  No acute fracture or dislocation.  No bone destruction identified.  Slight narrowing of the tibiofemoral joint spaces.        Assessment:       Encounter Diagnoses   Name Primary?    Primary osteoarthritis of left knee Yes    Primary osteoarthritis of right knee             Plan:         1. IKDC, SF-12 and KOOS was not filled out today in clinic.     RTC in 1 week for right knee Iovera.  He will not fill out IKDC, SF-12 and KOOS on return.    2.  Mobic 15mg once daily prn pain    3. HEP 99501 - Paulo Amaya and I, instructed and demonstrated a patellofemoral HEP. The patient then demonstrated understanding of exercises and proper technique. This program was performed for 15 minutes.      4. Continue visco skin as needed    5. I made the decision to obtain old records of the patient including previous notes and imaging. New imaging was ordered today of the extremity or extremities evaluated. I independently reviewed and interpreted the radiographs and/or MRIs today as well as prior imaging. Reviewed with patient in detail.    6. Injection Procedure left knee  A time out was performed, including verification of patient ID, procedure, site and side, availability of information and equipment, review of safety issues, and agreement with consent, the procedure site was marked.    After time out was performed, the patient was prepped aseptically with povidone-iodine swabsticks. A diagnostic and therapeutic injection of 1:3cc Kenalog/Marcaine was given under sterile technique using a 22g x 1.5 needle from the Superolateral  aspect of the left Knee Joint in the supine position.      Pau Brito had no adverse reactions to the medication. Pain decreased. He was instructed to apply ice to the joint for 20 minutes and avoid strenuous activities for 24-36 hours following the injection. He was warned of possible blood sugar and/or blood pressure changes during that time.  Following that time, he can resume regular activities.    He was reminded to call the clinic immediately for any adverse side effects as explained in clinic today.    7. Visit Summary: At this time, Mr. Brito has a long standing history of knee pain on the RIGHT side. He is unsure who reconstructed his ACL 15+ years ago at [facility]. Given a brochure on Depuy total knee by Dr. Duffy. Patient is not interested in TKA at this time. He would like to continue with Zilretta and Iovera and short acting CSI in left knee today.                     [facility] patient questionnaires have been collected today.

## 2024-02-02 ENCOUNTER — PROCEDURE VISIT (OUTPATIENT)
Dept: SPORTS MEDICINE | Facility: CLINIC | Age: 44
End: 2024-02-02
Payer: MEDICAID

## 2024-02-02 VITALS
HEART RATE: 82 BPM | BODY MASS INDEX: 30.33 KG/M2 | WEIGHT: 255.75 LBS | SYSTOLIC BLOOD PRESSURE: 141 MMHG | DIASTOLIC BLOOD PRESSURE: 82 MMHG

## 2024-02-02 DIAGNOSIS — G89.29 CHRONIC PAIN OF RIGHT KNEE: ICD-10-CM

## 2024-02-02 DIAGNOSIS — M25.561 CHRONIC PAIN OF RIGHT KNEE: ICD-10-CM

## 2024-02-02 PROCEDURE — 99499 UNLISTED E&M SERVICE: CPT | Mod: S$GLB,,, | Performed by: PHYSICIAN ASSISTANT

## 2024-02-02 PROCEDURE — 64640 INJECTION TREATMENT OF NERVE: CPT | Mod: RT,S$GLB,, | Performed by: PHYSICIAN ASSISTANT

## 2024-02-02 PROCEDURE — 64640 INJECTION TREATMENT OF NERVE: CPT | Mod: PBBFAC,RT | Performed by: PHYSICIAN ASSISTANT

## 2024-02-02 NOTE — PROCEDURES
Procedures  CC: right knee pain    43 y.o. Male presents today for Iovera procedure for right knee pain.    INFORMED CONSENT: I verify that I personally obtained Pau Brito's consent which was signed and uploaded into [facility].     PAIN SCORE:  Pre-procedure:  7/10  Gait: Antalgic    Inspection/Palpation:   +Skin warm and dry without open wounds or erythema  -Rubor   -Calor    Procedure Note Iovera:    DATE OF PROCEDURE:  02/02/2024    PREOPERATIVE DIAGNOSIS: right knee pain.     POSTOPERATIVE DIAGNOSIS: right knee pain.     PROCEDURE: Iovera treatment of anterior femoral cutaneous nerve, Lateral femoral cut nerve, and superior and inferior branches of infrapatellar saphenous nerve using at least 4+ different punctures to treat all 4 nerves. (cpt 64640 x4)    COMPLICATIONS: None.     IMPLANTS:  None    ESTIMATED BLOOD LOSS:  < 5cc    SPECIMENS REMOVED:  None    ANESTHESIA: 20cc Local xylocaine with epinephrine    INDICATIONS FOR PROCEDURE: This is a 43 y.o. male with longstanding knee pain. They have failed non operative management including injections.I discussed a new treatment therapy called Iovera, which is cryotherapy, to provide symptomatic relief along the sensory distribution of the infrapatellar tendon branch of the saphenous nerve  and AFCN. The patient elected to move forward with this  We did discuss the fact that this is a fairly novel procedure and there is very limited scientific data around this.  However, it FDA approved.  The patient was given patient information and literature to review prior to the procedure as well.  Based on this, the patient agreed to move forward with doing the procedure.    Time was spent discussing the cryoanalgesia procedure Iovera with the patient.  Risks and benefits were also discussed with patient.  X-rays were reviewed to use as a diagram to explain procedure. A detailed description of landmarks and placement of anesthetic used during procedure were also explained to  patient.  Contraindications for procedure were discussed with patient.    CONSENT:  Verbal and written consent were obtained from the patient.     PROCEDURE:    A surgical time out was performed, including verification of patient ID, procedure to be performed, site and side, availability of information and equipment, review of safety issues, and the patients agreement and consent.  The patient was placed supine on the exam table and the proximal medial aspect of the right tibia and anterior aspect of distal femur was prepped with sterile Chlorhexidine and alcohol.  A line was drawn extending approximately 5 cm medial to inferior pole of the patella distally to a point approximately 5 cm medial to the tibial tubercle.  A second line was drawn in a medial to lateral direction the width of the patella approximately 15 cm proximal to the patella. We then infiltrated the skin with lidocaine with epinephrine along both lines using a 25g needle. We then introduced the Iovera device along these lines and this device penetrated the skin, creating cryotherapy to the superior and inferior branches of the infrapatellar saphenous nerve, a third treatment to the anterior femoral cutaneous nerve, and fourth LFCN. 10 punctures of the skin were made to treat the superior and inferior branches of the ISN and another 10 punctures were made to treat the AFCN and LFCN. There were a total of 4 nerves treated with iovera. The patient tolerated the procedure well with no problems.    PAIN SCORES:  Pre-procedure:  7/10  Post-procedure:  0/10  Gait: Non-Antalgic      ASSESSMENT:      ICD-10-CM ICD-9-CM   1. Chronic pain of right knee  M25.561 719.46    G89.29 338.29         PLAN:    All questions were answered to the best of my ability and all concerns were addressed at this time.    This note is dictated using the M*Modal Fluency Direct word recognition program. There are word recognition mistakes that are occasionally missed on  review.

## 2024-03-07 ENCOUNTER — TELEPHONE (OUTPATIENT)
Dept: SPORTS MEDICINE | Facility: CLINIC | Age: 44
End: 2024-03-07
Payer: COMMERCIAL

## 2024-03-07 NOTE — TELEPHONE ENCOUNTER
Called the patient in regard to his appointment on 3/15.       Purpose of call: to inform the patient that Selam Byrnes PA-C will not be in the clinic on 3/15. The patient's appointment has been rescheduled to 313 at 9am. The patient can call the office at [phone] if he has any questions or concerns.

## 2024-03-13 ENCOUNTER — OFFICE VISIT (OUTPATIENT)
Dept: SPORTS MEDICINE | Facility: CLINIC | Age: 44
End: 2024-03-13
Payer: MEDICAID

## 2024-03-13 VITALS
BODY MASS INDEX: 30.34 KG/M2 | DIASTOLIC BLOOD PRESSURE: 84 MMHG | HEART RATE: 69 BPM | HEIGHT: 77 IN | SYSTOLIC BLOOD PRESSURE: 129 MMHG | WEIGHT: 257 LBS

## 2024-03-13 DIAGNOSIS — G89.29 CHRONIC PAIN OF LEFT KNEE: ICD-10-CM

## 2024-03-13 DIAGNOSIS — M17.12 PRIMARY OSTEOARTHRITIS OF LEFT KNEE: Primary | ICD-10-CM

## 2024-03-13 DIAGNOSIS — M25.562 CHRONIC PAIN OF LEFT KNEE: ICD-10-CM

## 2024-03-13 PROCEDURE — 99999 PR PBB SHADOW E&M-EST. PATIENT-LVL III: CPT | Mod: PBBFAC,,, | Performed by: PHYSICIAN ASSISTANT

## 2024-03-13 PROCEDURE — 99214 OFFICE O/P EST MOD 30 MIN: CPT | Mod: S$PBB,,, | Performed by: PHYSICIAN ASSISTANT

## 2024-03-13 PROCEDURE — 99213 OFFICE O/P EST LOW 20 MIN: CPT | Mod: PBBFAC | Performed by: PHYSICIAN ASSISTANT

## 2024-03-13 NOTE — PROGRESS NOTES
Subjective:          Chief Complaint: Pau Brito is a 43 y.o. male who had concerns including Follow-up of the Left Knee.       Pau Brito is a 43 y.o. male  who reports a new left knee pain x 5 months. Denies a specific LELO. Pain is located in patellofemoral joint. Denies any previous history of surgery to left knee. Denies mechanical symptoms or instability but report crepitus and swelling with increased activity. He received a left knee CSI on 1/29/24 with mild relief of pain. He is still feeling pain with prolonged standing, walking, and stairs. Pain decreased from a 8/10 to a 5/10 after the CSI, but he is still reporting symptoms. He has been completing a daily patellofemoral and quad strengthening home exercise program x 6 weeks with no decrease in pain but improvement in strength. He is here today to discuss treatment options.         Pain  Associated symptoms include joint swelling. Pertinent negatives include no abdominal pain, chest pain, chills, congestion, coughing, fever, nausea, numbness, rash, sore throat or vomiting.   Follow-up  Associated symptoms include joint swelling. Pertinent negatives include no abdominal pain, chest pain, chills, congestion, coughing, fever, nausea, numbness, rash, sore throat or vomiting.       Review of Systems   Constitutional: Negative for chills and fever.   HENT:  Negative for congestion and sore throat.    Eyes:  Negative for discharge and double vision.   Cardiovascular:  Negative for chest pain, palpitations and syncope.   Respiratory:  Negative for cough and shortness of breath.    Endocrine: Negative for cold intolerance and heat intolerance.   Skin:  Negative for dry skin and rash.   Musculoskeletal:  Positive for joint pain, joint swelling and stiffness.   Gastrointestinal:  Negative for abdominal pain, nausea and vomiting.   Neurological:  Negative for focal weakness, numbness and paresthesias.       Pain Related Questions  Over the past 3 days,  what was your average pain during activity? (I.e. running, jogging, walking, climbing stairs, getting dressed, ect.): 7  Over the past 3 days, what was your highest pain level?: 8  Over the past 3 days, what was your lowest pain level? : 5    Other  How many nights a week are you awakened by your affected body part?: 2  Was the patient's HEIGHT measured or patient reported?: Patient Reported  Was the patient's WEIGHT measured or patient reported?: Measured      Objective:        General: Pau is well-developed, well-nourished, appears stated age, in no acute distress, alert and oriented to time, place and person.     General    Vitals reviewed.  Constitutional: He is oriented to person, place, and time. He appears well-developed and well-nourished. No distress.   HENT:   Mouth/Throat: No oropharyngeal exudate.   Eyes: Right eye exhibits no discharge. Left eye exhibits no discharge.   Pulmonary/Chest: Effort normal and breath sounds normal. No respiratory distress.   Neurological: He is alert and oriented to person, place, and time. He has normal reflexes. No cranial nerve deficit. Coordination normal.   Psychiatric: He has a normal mood and affect. His behavior is normal. Judgment and thought content normal.     General Musculoskeletal Exam   Gait: abnormal and antalgic       Right Knee Exam     Inspection   Erythema: absent  Scars: present  Swelling: present  Effusion: present  Deformity: absent  Bruising: absent    Tenderness   The patient is tender to palpation of the patella.    Crepitus   The patient has crepitus of the patella (moderate).    Range of Motion   Extension:  10 abnormal   Flexion:  120 abnormal     Tests   Meniscus   Mara:  Medial - negative Lateral - negative  Ligament Examination   Lachman: normal (-1 to 2mm)   PCL-Posterior Drawer: normal (0 to 2mm)     MCL - Valgus: normal (0 to 2mm)  LCL - Varus: normal  Pivot Shift: normal (Equal)  Reverse Pivot Shift: normal (Equal)  Posterior Sag  Test: negative  Posterolateral Corner: stable  Patella   Patellar apprehension: negative  Passive Patellar Tilt: neutral  Patellar Tracking: normal  Patellar Glide (quadrants): Lateral - 1   Medial - 2  Q-Angle at 90 degrees: normal  Patellar Grind: positive  J-Sign: none    Other   Meniscal Cyst: absent  Popliteal (Baker's) Cyst: absent  Sensation: normal    Comments:  Severe genu varum    Left Knee Exam     Inspection   Erythema: absent  Scars: absent  Swelling: absent  Effusion: absent  Deformity: absent  Bruising: absent    Tenderness   The patient tender to palpation of the lateral joint line.    Crepitus   The patient has crepitus of the patella.    Range of Motion   Extension:  0 normal   Flexion:  140 normal     Tests   Meniscus   Mara:  Medial - negative Lateral - negative  Stability   Lachman: normal (-1 to 2mm)   PCL-Posterior Drawer: normal (0 to 2mm)  MCL - Valgus: normal (0 to 2mm)  LCL - Varus: normal (0 to 2mm)  Pivot Shift: normal (Equal)  Reverse Pivot Shift: normal (Equal)  Posterior Sag Test: negative  Posterolateral Corner: stable  Patella   Patellar apprehension: negative  Passive Patellar Tilt: neutral  Patellar Tracking: normal  Patellar Glide (Quadrants): Lateral - 1 Medial - 2  Q-Angle at 90 degrees: normal  Patellar Grind: negative  J-Sign: J sign absent    Other   Meniscal Cyst: absent  Popliteal (Baker's) Cyst: absent  Sensation: normal    Right Hip Exam     Tests   Jillian: negative  Left Hip Exam     Tests   Jillian: negative          Muscle Strength   Right Lower Extremity   Hip Abduction: 5/5   Quadriceps:  4/5 (pain)   Hamstrin/5   Left Lower Extremity   Hip Abduction: 5/5   Quadriceps:  5/5   Hamstrin/5     Reflexes     Left Side  Achilles:  2+  Quadriceps:  2+    Right Side   Achilles:  2+  Quadriceps:  2+    Vascular Exam     Right Pulses  Dorsalis Pedis:      2+  Posterior Tibial:      2+        Left Pulses  Dorsalis Pedis:      2+  Posterior Tibial:       2+      Radiographic Findings:  1/29/24  Bilateral knees:  FINDINGS:  Right knee: Significant DJD with osteophytosis and narrowing of the tibiofemoral joint spaces identified.  Suggestion of a possible ACL repair.  No acute fracture or bone destruction.     Left knee: DJD with osteophytosis arising from the patella as before.  No acute fracture or dislocation.  No bone destruction identified.  Slight narrowing of the tibiofemoral joint spaces.      Assessment:       Encounter Diagnoses   Name Primary?    Chronic pain of left knee     Primary osteoarthritis of left knee Yes         Plan:         1. IKDC, SF-12 and KOOS was not filled out today in clinic.     RTC in 2 weeks with Left knee Durolane injection. He will not fill out IKDC, SF-12 and KOOS on return.    2.  Mobic 15mg once daily prn pain    3. Continue visco skin as needed    4. I made the decision to obtain old records of the patient including previous notes and imaging. New imaging was ordered today of the extremity or extremities evaluated. I independently reviewed and interpreted the radiographs and/or MRIs today as well as prior imaging. Reviewed with patient in detail.    5.We have discussed a variety of treatment options including medications, injections, physical therapy and other alternative treatments. I also explained the indications, risks and benefits of surgery.  Patient's pain is refractory HEP, conservative management, and NSAIDs. Pt would like to proceed with HEP and visco-supplementation in left knee.    Medical Necessity for viscosupplementation use: After thorough evaluation of the patient, I have determined that viscosupplementation treatment is medically necessary. The patient has painful degenerative joint disease (DJD) of the knee(s) with failure of conservative treatments including lifestyle modifications and rehabilitation exercises. Oral analgesics including NSAIDs have not adequately controlled the patient's symptoms. There is  radiographic evidence of Kellgren-David grade II (or greater) osteoarthritic (OA) changes, or if lack of radiographic evidence, there is arthroscopic or other evidence of chondrosis of the knee(s).     Pre-authorization placed for left  Durolane  injection.  6. Ice compress to the affected area 2-3x a day for 15-20 minutes as needed for pain management.  7. RTC to see Selam Byrnes PA-C for left  knee Durolane  injection.  8. Visit Summary: At this time, Mr. Brito has a long standing history of knee pain on the RIGHT side. He is unsure who reconstructed his ACL 15+ years ago at [facility]. Given a brochure on Depuy total knee by Dr. Duffy. Patient is not interested in TKA at this time due to financial situation. He would like to try visco in left knee. If denied or no improvement from visco injection, will proceed with MRI of left knee.                     [facility] patient questionnaires have been collected today.

## 2024-03-27 ENCOUNTER — OFFICE VISIT (OUTPATIENT)
Dept: SPORTS MEDICINE | Facility: CLINIC | Age: 44
End: 2024-03-27
Payer: MEDICAID

## 2024-03-27 VITALS
SYSTOLIC BLOOD PRESSURE: 131 MMHG | DIASTOLIC BLOOD PRESSURE: 89 MMHG | HEIGHT: 77 IN | HEART RATE: 63 BPM | BODY MASS INDEX: 30.23 KG/M2 | WEIGHT: 256.06 LBS

## 2024-03-27 DIAGNOSIS — M17.12 PRIMARY OSTEOARTHRITIS OF LEFT KNEE: Primary | ICD-10-CM

## 2024-03-27 DIAGNOSIS — M17.11 PRIMARY OSTEOARTHRITIS OF RIGHT KNEE: ICD-10-CM

## 2024-03-27 PROCEDURE — 99499 UNLISTED E&M SERVICE: CPT | Mod: S$PBB,,, | Performed by: PHYSICIAN ASSISTANT

## 2024-03-27 PROCEDURE — 99999 PR PBB SHADOW E&M-EST. PATIENT-LVL III: CPT | Mod: PBBFAC,,, | Performed by: PHYSICIAN ASSISTANT

## 2024-03-27 PROCEDURE — 20610 DRAIN/INJ JOINT/BURSA W/O US: CPT | Mod: 50,PBBFAC | Performed by: PHYSICIAN ASSISTANT

## 2024-03-27 PROCEDURE — 99213 OFFICE O/P EST LOW 20 MIN: CPT | Mod: PBBFAC,25 | Performed by: PHYSICIAN ASSISTANT

## 2024-03-27 PROCEDURE — 99999PBSHW PR PBB SHADOW TECHNICAL ONLY FILED TO HB: Mod: PBBFAC,,,

## 2024-03-27 PROCEDURE — 20610 DRAIN/INJ JOINT/BURSA W/O US: CPT | Mod: 50,S$PBB,, | Performed by: PHYSICIAN ASSISTANT

## 2024-03-27 RX ORDER — TRIAMCINOLONE ACETONIDE 40 MG/ML
40 INJECTION, SUSPENSION INTRA-ARTICULAR; INTRAMUSCULAR
Status: COMPLETED | OUTPATIENT
Start: 2024-03-27 | End: 2024-03-27

## 2024-03-27 RX ORDER — BUPIVACAINE HYDROCHLORIDE 2.5 MG/ML
3 INJECTION, SOLUTION INFILTRATION; PERINEURAL
Status: COMPLETED | OUTPATIENT
Start: 2024-03-27 | End: 2024-03-27

## 2024-03-27 RX ADMIN — Medication 60 MG: at 09:03

## 2024-03-27 RX ADMIN — BUPIVACAINE HYDROCHLORIDE 7.5 MG: 2.5 INJECTION, SOLUTION INFILTRATION; PERINEURAL at 09:03

## 2024-03-27 RX ADMIN — TRIAMCINOLONE ACETONIDE 40 MG: 40 INJECTION, SUSPENSION INTRA-ARTICULAR; INTRAMUSCULAR at 09:03

## 2024-03-27 NOTE — PROGRESS NOTES
Patient is here for follow up of left and right knee arthritis. Pt is requesting left knee Durolane injection and right knee CSI. [facility] reviewed per encounter record. Has failed other conservative modalities including NSAIDS, activity modification, weight loss.    The prior shot was tolerated well.    PHYSICAL EXAMINATION:     General: The patient is alert and oriented x 3. Mood is pleasant.   Observation of ears, eyes and nose reveals no gross abnormalities. No   labored breathing observed.     No signs of infection or adverse reaction to knee.    PROCEDURE NOTE:  Injection Procedure left knee  A time out was performed, including verification of patient ID, procedure, site and side, availability of information and equipment, review of safety issues, and agreement with consent, the procedure site was marked.    After time out was performed, the patient was prepped aseptically with povidone-iodine swabsticks. A diagnostic and therapeutic injection of 3cc Durolane was given under sterile technique using a 22g x 1.5 needle from the Superolateral  aspect of the left Knee Joint in the supine position.      Pau Brito had no adverse reactions to the medication. Pain decreased. He was instructed to apply ice to the joint for 20 minutes and avoid strenuous activities for 24-36 hours following the injection. He was warned of possible blood sugar and/or blood pressure changes during that time. Following that time, he can resume regular activities.    Aspiration/Injection Procedure right knee    A time out was performed, including verification of patient ID, procedure, site and side, availability of information and equipment, review of safety issues, and agreement with consent, the procedure site was marked.    Aspiration:  After time out was performed, the patient was prepped aseptically with povidone-iodine swabsticks. 36cc's of normal joint fluid was aspirated from the Superolateral  aspect of the right Knee Joint using a  22g x 1.5 needle in sterile fashion without complication.     Injection:  Following aspiration, a diagnostic and therapeutic injection of 1:3cc Kenalog/Marcaine was given under sterile technique in the supine position.     Pau Brito had no adverse reactions to the medication. Pain decreased. He was instructed to apply ice to the joint for 20 minutes and avoid strenuous activities for 24-36 hours following the injection. He was warned of possible blood sugar and/or blood pressure changes during that time. Following that time, he can resume regular activities.    He was reminded to call the clinic immediately for any adverse side effects as explained in clinic today.    RTC in 3 months with Selam Byrnes PA-C for follow up bilateral knees.  All questions were answered, pt will contact us for questions or concerns in the interim.

## 2024-03-29 ENCOUNTER — PATIENT MESSAGE (OUTPATIENT)
Dept: SPORTS MEDICINE | Facility: CLINIC | Age: 44
End: 2024-03-29
Payer: COMMERCIAL

## 2024-04-03 DIAGNOSIS — M17.12 PRIMARY OSTEOARTHRITIS OF LEFT KNEE: Primary | ICD-10-CM

## 2024-06-30 DIAGNOSIS — M17.11 PRIMARY OSTEOARTHRITIS OF RIGHT KNEE: ICD-10-CM

## 2024-06-30 DIAGNOSIS — M23.91 INTERNAL DERANGEMENT OF RIGHT KNEE: ICD-10-CM

## 2024-07-01 RX ORDER — MELOXICAM 15 MG/1
TABLET ORAL
Qty: 30 TABLET | Refills: 2 | Status: SHIPPED | OUTPATIENT
Start: 2024-07-01

## 2024-07-31 ENCOUNTER — TELEPHONE (OUTPATIENT)
Dept: SPORTS MEDICINE | Facility: CLINIC | Age: 44
End: 2024-07-31
Payer: COMMERCIAL

## 2024-07-31 NOTE — TELEPHONE ENCOUNTER
Spoke to the patient in regard to his appt on 8/12 with Selam Byrnes PA-C. While on the call I informed the patient that I was calling to confirm which need he is wishing to proceed with Iovera. The patient reports the right knee. I informed the patient that his provider is out of the office until 8/5. Upon her return to clinic I will discuss if he can proceed with Iovera of the rt knee. I also informed the patient that I will keep his appt on 8/12 with Selam until I confirm if he can proceed or if the provider feels he need to be seen and evaluated before proceeding with right knee Iovera. The patient verbally expressed he understood.

## 2024-08-06 ENCOUNTER — TELEPHONE (OUTPATIENT)
Dept: SPORTS MEDICINE | Facility: CLINIC | Age: 44
End: 2024-08-06
Payer: MEDICAID

## 2024-08-06 DIAGNOSIS — G89.29 CHRONIC PAIN OF RIGHT KNEE: ICD-10-CM

## 2024-08-06 DIAGNOSIS — M17.12 PRIMARY OSTEOARTHRITIS OF LEFT KNEE: Primary | ICD-10-CM

## 2024-08-06 DIAGNOSIS — M25.561 CHRONIC PAIN OF RIGHT KNEE: ICD-10-CM

## 2024-08-12 ENCOUNTER — OFFICE VISIT (OUTPATIENT)
Dept: SPORTS MEDICINE | Facility: CLINIC | Age: 44
End: 2024-08-12
Payer: MEDICAID

## 2024-08-12 VITALS
DIASTOLIC BLOOD PRESSURE: 92 MMHG | WEIGHT: 256.19 LBS | SYSTOLIC BLOOD PRESSURE: 135 MMHG | HEART RATE: 65 BPM | HEIGHT: 77 IN | BODY MASS INDEX: 30.25 KG/M2

## 2024-08-12 DIAGNOSIS — M17.11 PRIMARY OSTEOARTHRITIS OF RIGHT KNEE: ICD-10-CM

## 2024-08-12 DIAGNOSIS — M17.12 PRIMARY OSTEOARTHRITIS OF LEFT KNEE: Primary | ICD-10-CM

## 2024-08-12 PROCEDURE — 99213 OFFICE O/P EST LOW 20 MIN: CPT | Mod: PBBFAC | Performed by: PHYSICIAN ASSISTANT

## 2024-08-12 PROCEDURE — 20610 DRAIN/INJ JOINT/BURSA W/O US: CPT | Mod: 50,PBBFAC | Performed by: PHYSICIAN ASSISTANT

## 2024-08-12 PROCEDURE — 99214 OFFICE O/P EST MOD 30 MIN: CPT | Mod: 25,S$PBB,, | Performed by: PHYSICIAN ASSISTANT

## 2024-08-12 PROCEDURE — 1159F MED LIST DOCD IN RCRD: CPT | Mod: CPTII,,, | Performed by: PHYSICIAN ASSISTANT

## 2024-08-12 PROCEDURE — 99999 PR PBB SHADOW E&M-EST. PATIENT-LVL III: CPT | Mod: PBBFAC,,, | Performed by: PHYSICIAN ASSISTANT

## 2024-08-12 PROCEDURE — 99999PBSHW PR PBB SHADOW TECHNICAL ONLY FILED TO HB: Mod: PBBFAC,,,

## 2024-08-12 PROCEDURE — 20610 DRAIN/INJ JOINT/BURSA W/O US: CPT | Mod: 50,S$PBB,, | Performed by: PHYSICIAN ASSISTANT

## 2024-08-12 PROCEDURE — 3075F SYST BP GE 130 - 139MM HG: CPT | Mod: CPTII,,, | Performed by: PHYSICIAN ASSISTANT

## 2024-08-12 PROCEDURE — 3008F BODY MASS INDEX DOCD: CPT | Mod: CPTII,,, | Performed by: PHYSICIAN ASSISTANT

## 2024-08-12 PROCEDURE — 1160F RVW MEDS BY RX/DR IN RCRD: CPT | Mod: CPTII,,, | Performed by: PHYSICIAN ASSISTANT

## 2024-08-12 PROCEDURE — 3080F DIAST BP >= 90 MM HG: CPT | Mod: CPTII,,, | Performed by: PHYSICIAN ASSISTANT

## 2024-08-12 RX ORDER — TRIAMCINOLONE ACETONIDE 40 MG/ML
40 INJECTION, SUSPENSION INTRA-ARTICULAR; INTRAMUSCULAR
Status: COMPLETED | OUTPATIENT
Start: 2024-08-12 | End: 2024-08-12

## 2024-08-12 RX ORDER — BUPIVACAINE HYDROCHLORIDE 5 MG/ML
3 INJECTION, SOLUTION PERINEURAL
Status: COMPLETED | OUTPATIENT
Start: 2024-08-12 | End: 2024-08-12

## 2024-08-12 RX ADMIN — BUPIVACAINE HYDROCHLORIDE 15 MG: 5 INJECTION, SOLUTION PERINEURAL at 08:08

## 2024-08-12 RX ADMIN — TRIAMCINOLONE ACETONIDE 40 MG: 40 INJECTION, SUSPENSION INTRA-ARTICULAR; INTRAMUSCULAR at 08:08

## 2024-08-12 NOTE — PROGRESS NOTES
Subjective:          Chief Complaint: Pau Brito is a 43 y.o. male who had concerns including Pain of the Right Knee and Pain of the Left Knee.       Pau Brito is a 43 y.o. male  who reports to clinic for follow up bilateral knees. He is requesting bilateral knee CSIs today. Pain in right knee is 10/10. Pain in left knee is 5/10. Pain has been increasing over the past few weeks. Denies any new injury. He has known severe OA in his right knee but is not ready to move forward with TKA at this time. Pain in left knee is located in patellofemoral joint. Pain in right knee is located globally. Denies any previous history of surgery to left knee. Denies mechanical symptoms or instability but report crepitus and swelling with increased activity. He received a left knee CSI on 1/29/24 with mild relief of pain and a left knee Durolane injection on 3/27/24. Previous right knee CSI on 3/27/24 and Zilretta on 12/4/23. He has also received a right knee Iovera on 2/2/24 and 6/2/24 with significant relief of pain. He would like to receive the right knee Iovera again when it is approved. He is still feeling pain with prolonged standing, walking, and stairs.         Pain  Associated symptoms include joint swelling. Pertinent negatives include no abdominal pain, chest pain, chills, congestion, coughing, fever, nausea, numbness, rash, sore throat or vomiting.   Follow-up  Associated symptoms include joint swelling. Pertinent negatives include no abdominal pain, chest pain, chills, congestion, coughing, fever, nausea, numbness, rash, sore throat or vomiting.       Review of Systems   Constitutional: Negative for chills and fever.   HENT:  Negative for congestion and sore throat.    Eyes:  Negative for discharge and double vision.   Cardiovascular:  Negative for chest pain, palpitations and syncope.   Respiratory:  Negative for cough and shortness of breath.    Endocrine: Negative for cold intolerance and heat intolerance.    Skin:  Negative for dry skin and rash.   Musculoskeletal:  Positive for joint pain, joint swelling and stiffness.   Gastrointestinal:  Negative for abdominal pain, nausea and vomiting.   Neurological:  Negative for focal weakness, numbness and paresthesias.       Pain Related Questions  Over the past 3 days, what was your average pain during activity? (I.e. running, jogging, walking, climbing stairs, getting dressed, ect.): 10  Over the past 3 days, what was your highest pain level?: 10  Over the past 3 days, what was your lowest pain level? : 10    Other  How many nights a week are you awakened by your affected body part?: 0  Was the patient's HEIGHT measured or patient reported?: Measured  Was the patient's WEIGHT measured or patient reported?: Measured      Objective:        General: Pau is well-developed, well-nourished, appears stated age, in no acute distress, alert and oriented to time, place and person.     General    Vitals reviewed.  Constitutional: He is oriented to person, place, and time. He appears well-developed and well-nourished. No distress.   HENT:   Mouth/Throat: No oropharyngeal exudate.   Eyes: Right eye exhibits no discharge. Left eye exhibits no discharge.   Pulmonary/Chest: Effort normal and breath sounds normal. No respiratory distress.   Neurological: He is alert and oriented to person, place, and time. He has normal reflexes. No cranial nerve deficit. Coordination normal.   Psychiatric: He has a normal mood and affect. His behavior is normal. Judgment and thought content normal.     General Musculoskeletal Exam   Gait: abnormal and antalgic       Right Knee Exam     Inspection   Erythema: absent  Scars: present  Swelling: present  Effusion: present  Deformity: absent  Bruising: absent    Tenderness   The patient is tender to palpation of the patella.    Crepitus   The patient has crepitus of the patella (moderate).    Range of Motion   Extension:  10 abnormal   Flexion:  100 abnormal      Tests   Meniscus   Mara:  Medial - negative Lateral - negative  Ligament Examination   Lachman: normal (-1 to 2mm)   PCL-Posterior Drawer: normal (0 to 2mm)     MCL - Valgus: normal (0 to 2mm)  LCL - Varus: normal  Pivot Shift: normal (Equal)  Reverse Pivot Shift: normal (Equal)  Posterior Sag Test: negative  Posterolateral Corner: stable  Patella   Patellar apprehension: negative  Passive Patellar Tilt: neutral  Patellar Tracking: normal  Patellar Glide (quadrants): Lateral - 1   Medial - 2  Q-Angle at 90 degrees: normal  Patellar Grind: positive  J-Sign: none    Other   Meniscal Cyst: absent  Popliteal (Baker's) Cyst: absent  Sensation: normal    Comments:  Severe genu varum    Left Knee Exam     Inspection   Erythema: absent  Scars: absent  Swelling: absent  Effusion: absent  Deformity: absent  Bruising: absent    Tenderness   The patient tender to palpation of the lateral joint line.    Crepitus   The patient has crepitus of the patella.    Range of Motion   Extension:  0 normal   Flexion:  130 normal     Tests   Meniscus   Mara:  Medial - negative Lateral - negative  Stability   Lachman: normal (-1 to 2mm)   PCL-Posterior Drawer: normal (0 to 2mm)  MCL - Valgus: normal (0 to 2mm)  LCL - Varus: normal (0 to 2mm)  Pivot Shift: normal (Equal)  Reverse Pivot Shift: normal (Equal)  Posterior Sag Test: negative  Posterolateral Corner: stable  Patella   Patellar apprehension: negative  Passive Patellar Tilt: neutral  Patellar Tracking: normal  Patellar Glide (Quadrants): Lateral - 1 Medial - 2  Q-Angle at 90 degrees: normal  Patellar Grind: negative  J-Sign: J sign absent    Other   Meniscal Cyst: absent  Popliteal (Baker's) Cyst: absent  Sensation: normal    Right Hip Exam     Tests   Jillian: negative  Left Hip Exam     Tests   Jillian: negative          Muscle Strength   Right Lower Extremity   Hip Abduction: 5/5   Quadriceps:  4/5 (pain)   Hamstrin/5   Left Lower Extremity   Hip Abduction: 5/5    Quadriceps:  5/5   Hamstrin/5     Reflexes     Left Side  Achilles:  2+  Quadriceps:  2+    Right Side   Achilles:  2+  Quadriceps:  2+    Vascular Exam     Right Pulses  Dorsalis Pedis:      2+  Posterior Tibial:      2+        Left Pulses  Dorsalis Pedis:      2+  Posterior Tibial:      2+      Radiographic Findings:  24  Bilateral knees:  FINDINGS:  Right knee: Significant DJD with osteophytosis and narrowing of the tibiofemoral joint spaces identified.  Suggestion of a possible ACL repair.  No acute fracture or bone destruction.     Left knee: DJD with osteophytosis arising from the patella as before.  No acute fracture or dislocation.  No bone destruction identified.  Slight narrowing of the tibiofemoral joint spaces.      Assessment:       Encounter Diagnoses   Name Primary?    Primary osteoarthritis of left knee Yes    Primary osteoarthritis of right knee          Plan:         1. IKDC, SF-12 and KOOS was not filled out today in clinic.     RTC in 6 weeks for right knee Iovera procedure. He will not fill out IKDC, SF-12 and KOOS on return.    2.  Mobic 15mg once daily prn pain    3. Continue visco skin as needed    4. I made the decision to obtain old records of the patient including previous notes and imaging.  I independently reviewed and interpreted the radiographs and/or MRIs today as well as prior imaging. Reviewed with patient in detail.    5. We have discussed a variety of treatment options including medications, injections, physical therapy and other alternative treatments. I also explained the indications, risks and benefits of surgery.  Patient's pain is refractory HEP, conservative management, and NSAIDs. Pt would like to proceed with HEP, bilateral knee CSIs today, and right knee Iovera in 6 weeks.    Medical Necessity for viscosupplementation use: After thorough evaluation of the patient, I have determined that viscosupplementation treatment is medically necessary. The patient has  painful degenerative joint disease (DJD) of the knee(s) with failure of conservative treatments including lifestyle modifications and rehabilitation exercises. Oral analgesics including NSAIDs have not adequately controlled the patient's symptoms. There is radiographic evidence of Kellgren-David grade II (or greater) osteoarthritic (OA) changes, or if lack of radiographic evidence, there is arthroscopic or other evidence of chondrosis of the knee(s).     Pre-authorization placed for right knee Iovera.  6. Ice compress to the affected area 2-3x a day for 15-20 minutes as needed for pain management.  7.Aspiration/Injection Procedure right knee    A time out was performed, including verification of patient ID, procedure, site and side, availability of information and equipment, review of safety issues, and agreement with consent, the procedure site was marked.    Aspiration:  After time out was performed, the patient was prepped aseptically with povidone-iodine swabsticks. 28cc's of normal joint fluid was aspirated from the Superolateral  aspect of the right Knee Joint using a 22g x 1.5 needle in sterile fashion without complication.     Injection:  Following aspiration, a diagnostic and therapeutic injection of 1:3cc Kenalog/Marcaine was given under sterile technique in the supine position.     Pau Brito had no adverse reactions to the medication. Pain decreased. He was instructed to apply ice to the joint for 20 minutes and avoid strenuous activities for 24-36 hours following the injection. He was warned of possible blood sugar and/or blood pressure changes during that time. Following that time, he can resume regular activities.    Aspiration/Injection Procedure left knee    A time out was performed, including verification of patient ID, procedure, site and side, availability of information and equipment, review of safety issues, and agreement with consent, the procedure site was marked.    Aspiration:  After  time out was performed, the patient was prepped aseptically with povidone-iodine swabsticks. 0cc's of normal joint fluid was aspirated from the Superolateral  aspect of the left Knee Joint using a 22g x 1.5 needle in sterile fashion without complication.     Injection:  Following aspiration, a diagnostic and therapeutic injection of 1:3cc Kenalog/Marcaine was given under sterile technique in the supine position.     Pau Brito had no adverse reactions to the medication. Pain decreased. He was instructed to apply ice to the joint for 20 minutes and avoid strenuous activities for 24-36 hours following the injection. He was warned of possible blood sugar and/or blood pressure changes during that time. Following that time, he can resume regular activities.    8. RTC to see Selam Byrnes PA-C for right knee Iovera in 6 weeks.  9. Visit Summary: At this time, Mr. Brito has a long standing history of knee pain on the RIGHT side. He is unsure who reconstructed his ACL 15+ years ago at [facility]. Given a brochure on Depuy total knee by Dr. Duffy. Patient is not interested in TKA at this time due to financial situation. He would like to continue CSI in knees and right knee Iovera.                      [facility] patient questionnaires have been collected today.

## 2024-09-23 ENCOUNTER — TELEPHONE (OUTPATIENT)
Dept: SPORTS MEDICINE | Facility: CLINIC | Age: 44
End: 2024-09-23
Payer: MEDICAID

## 2024-10-23 ENCOUNTER — PROCEDURE VISIT (OUTPATIENT)
Dept: SPORTS MEDICINE | Facility: CLINIC | Age: 44
End: 2024-10-23
Payer: MEDICAID

## 2024-10-23 VITALS
HEIGHT: 77 IN | HEART RATE: 76 BPM | SYSTOLIC BLOOD PRESSURE: 126 MMHG | BODY MASS INDEX: 29.01 KG/M2 | WEIGHT: 245.69 LBS | DIASTOLIC BLOOD PRESSURE: 86 MMHG

## 2024-10-23 DIAGNOSIS — M17.11 PRIMARY OSTEOARTHRITIS OF RIGHT KNEE: ICD-10-CM

## 2024-10-23 DIAGNOSIS — G89.29 CHRONIC PAIN OF RIGHT KNEE: Primary | ICD-10-CM

## 2024-10-23 DIAGNOSIS — M25.561 CHRONIC PAIN OF RIGHT KNEE: Primary | ICD-10-CM

## 2024-10-23 PROCEDURE — 64640 INJECTION TREATMENT OF NERVE: CPT | Mod: S$PBB,RT,, | Performed by: PHYSICIAN ASSISTANT

## 2024-10-23 PROCEDURE — 64640 INJECTION TREATMENT OF NERVE: CPT | Mod: PBBFAC | Performed by: PHYSICIAN ASSISTANT

## 2024-10-23 PROCEDURE — 99499 UNLISTED E&M SERVICE: CPT | Mod: S$PBB,,, | Performed by: PHYSICIAN ASSISTANT

## 2024-10-23 NOTE — PROCEDURES
Procedures  CC: right knee pain    44 y.o. Male presents today for Iovera procedure for right knee pain.    INFORMED CONSENT: I verify that I personally obtained Pau Brito's consent which was signed and uploaded into [facility].     PAIN SCORE:  Pre-procedure:  8/10  Gait: Antalgic    Inspection/Palpation:   +Skin warm and dry without open wounds or erythema  -Rubor   -Calor    Procedure Note Iovera:    DATE OF PROCEDURE:  10/23/2024    PREOPERATIVE DIAGNOSIS: right knee pain.     POSTOPERATIVE DIAGNOSIS: right knee pain.     PROCEDURE: Iovera treatment of anterior femoral cutaneous nerve, Lateral femoral cut nerve, and superior and inferior branches of infrapatellar saphenous nerve using at least 4+ different punctures to treat all 4 nerves. (cpt 64640 x4)    COMPLICATIONS: None.     IMPLANTS:  None    ESTIMATED BLOOD LOSS:  < 5cc    SPECIMENS REMOVED:  None    ANESTHESIA: 20cc Local xylocaine with epinephrine    INDICATIONS FOR PROCEDURE: This is a 44 y.o. male with longstanding knee pain. They have failed non operative management including injections.I discussed a new treatment therapy called Iovera, which is cryotherapy, to provide symptomatic relief along the sensory distribution of the infrapatellar tendon branch of the saphenous nerve  and AFCN. The patient elected to move forward with this  We did discuss the fact that this is a fairly novel procedure and there is very limited scientific data around this.  However, it is FDA approved.  The patient was given patient information and literature to review prior to the procedure as well.  Based on this, the patient agreed to move forward with doing the procedure.    Time was spent discussing the cryoanalgesia procedure Iovera with the patient.  Risks and benefits were also discussed with patient.  X-rays were reviewed to use as a diagram to explain procedure. A detailed description of landmarks and placement of anesthetic used during procedure were also explained to  patient.  Contraindications for procedure were discussed with patient.    CONSENT:  Verbal and written consent were obtained from the patient.     PROCEDURE:    A surgical time out was performed, including verification of patient ID, procedure to be performed, site and side, availability of information and equipment, review of safety issues, and the patients agreement and consent.  The patient was placed supine on the exam table and the proximal medial aspect of the right tibia and anterior aspect of distal femur was prepped with sterile Chlorhexidine and alcohol.  A line was drawn extending approximately 5 cm medial to inferior pole of the patella distally to a point approximately 5 cm medial to the tibial tubercle.  A second line was drawn in a medial to lateral direction the width of the patella approximately 15 cm proximal to the patella. We then infiltrated the skin with lidocaine with epinephrine along both lines using a 25g needle. We then introduced the Iovera device along these lines and this device penetrated the skin, creating cryotherapy to the superior and inferior branches of the infrapatellar saphenous nerve, a third treatment to the anterior femoral cutaneous nerve, and fourth LFCN. 10 punctures of the skin were made to treat the superior and inferior branches of the ISN and another 10 punctures were made to treat the AFCN and LFCN. There were a total of 4 nerves treated with iovera. The patient tolerated the procedure well with no problems.    PAIN SCORES:  Pre-procedure:  8/10  Post-procedure:  0/10  Gait: Non-Antalgic      ASSESSMENT:      ICD-10-CM ICD-9-CM   1. Chronic pain of right knee  M25.561 719.46    G89.29 338.29   2. Primary osteoarthritis of right knee  M17.11 715.16         PLAN:    All questions were answered to the best of my ability and all concerns were addressed at this time.    This note is dictated using the [employer] Fluency Direct word recognition program. There are word  recognition mistakes that are occasionally missed on review.

## 2024-10-30 ENCOUNTER — PATIENT MESSAGE (OUTPATIENT)
Dept: RESEARCH | Facility: HOSPITAL | Age: 44
End: 2024-10-30
Payer: MEDICAID

## 2024-12-02 ENCOUNTER — PATIENT MESSAGE (OUTPATIENT)
Dept: SPORTS MEDICINE | Facility: CLINIC | Age: 44
End: 2024-12-02
Payer: MEDICAID

## 2025-02-19 ENCOUNTER — PATIENT MESSAGE (OUTPATIENT)
Dept: SPORTS MEDICINE | Facility: CLINIC | Age: 45
End: 2025-02-19
Payer: MEDICAID

## 2025-02-20 DIAGNOSIS — M17.11 PRIMARY OSTEOARTHRITIS OF RIGHT KNEE: Primary | ICD-10-CM

## 2025-03-07 ENCOUNTER — OFFICE VISIT (OUTPATIENT)
Dept: SPORTS MEDICINE | Facility: CLINIC | Age: 45
End: 2025-03-07
Payer: MEDICAID

## 2025-03-07 VITALS
WEIGHT: 244.81 LBS | HEART RATE: 75 BPM | BODY MASS INDEX: 29.03 KG/M2 | SYSTOLIC BLOOD PRESSURE: 134 MMHG | DIASTOLIC BLOOD PRESSURE: 88 MMHG

## 2025-03-07 DIAGNOSIS — M17.11 PRIMARY OSTEOARTHRITIS OF RIGHT KNEE: Primary | ICD-10-CM

## 2025-03-07 PROCEDURE — 99999 PR PBB SHADOW E&M-EST. PATIENT-LVL III: CPT | Mod: PBBFAC,,, | Performed by: PHYSICIAN ASSISTANT

## 2025-03-07 PROCEDURE — 99213 OFFICE O/P EST LOW 20 MIN: CPT | Mod: PBBFAC | Performed by: PHYSICIAN ASSISTANT

## 2025-03-07 NOTE — PROGRESS NOTES
Patient is here for follow up of right knee arthritis. Pt is requesting right [facility] Has failed other conservative modalities including NSAIDS, activity modification, weight loss.    The prior shot was tolerated well.    PHYSICAL EXAMINATION:     General: The patient is alert and oriented x 3. Mood is pleasant.   Observation of ears, eyes and nose reveals no gross abnormalities. No   labored breathing observed.     No signs of infection or adverse reaction to knee.    PROCEDURE NOTE:  Aspiration/Injection Procedure right knee    A time out was performed, including verification of patient ID, procedure, site and side, availability of information and equipment, review of safety issues, and agreement with consent, the procedure site was marked.    Aspiration:  After time out was performed, the patient was prepped aseptically with povidone-iodine swabsticks. 26cc's of normal joint fluid was aspirated from the Superolateral  aspect of the right Knee Joint using a 22g x 1.5 needle in sterile fashion without complication.     Injection:  Following aspiration, a diagnostic and therapeutic injection of 5cc Zilretta was given under sterile technique in the supine position.     Pau Brito had no adverse reactions to the medication. Pain decreased. He was instructed to apply ice to the joint for 20 minutes and avoid strenuous activities for 24-36 hours following the injection. He was warned of possible blood sugar and/or blood pressure changes during that time. Following that time, he can resume regular activities.      RTC in 6 months for possible repeat Zilretta injection.    All questions were answered, pt will contact us for questions or concerns in the interim.